# Patient Record
Sex: FEMALE | Race: WHITE | Employment: UNEMPLOYED | URBAN - METROPOLITAN AREA
[De-identification: names, ages, dates, MRNs, and addresses within clinical notes are randomized per-mention and may not be internally consistent; named-entity substitution may affect disease eponyms.]

---

## 2019-04-16 PROBLEM — E28.39 FEMALE HYPOGONADISM: Status: ACTIVE | Noted: 2019-04-16

## 2019-04-16 PROBLEM — I10 MODERATE HYPERTENSION: Status: ACTIVE | Noted: 2019-04-16

## 2021-08-17 ENCOUNTER — LAB VISIT (OUTPATIENT)
Dept: PRIMARY CARE CLINIC | Facility: OTHER | Age: 56
End: 2021-08-17
Payer: COMMERCIAL

## 2021-08-17 DIAGNOSIS — Z20.822 ENCOUNTER FOR LABORATORY TESTING FOR COVID-19 VIRUS: ICD-10-CM

## 2021-08-17 PROCEDURE — U0003 INFECTIOUS AGENT DETECTION BY NUCLEIC ACID (DNA OR RNA); SEVERE ACUTE RESPIRATORY SYNDROME CORONAVIRUS 2 (SARS-COV-2) (CORONAVIRUS DISEASE [COVID-19]), AMPLIFIED PROBE TECHNIQUE, MAKING USE OF HIGH THROUGHPUT TECHNOLOGIES AS DESCRIBED BY CMS-2020-01-R: HCPCS | Performed by: FAMILY MEDICINE

## 2021-08-18 LAB
SARS-COV-2 RNA RESP QL NAA+PROBE: NOT DETECTED
SARS-COV-2- CYCLE NUMBER: -1

## 2021-10-15 ENCOUNTER — HOSPITAL ENCOUNTER (OUTPATIENT)
Dept: RADIOLOGY | Facility: HOSPITAL | Age: 56
Discharge: HOME | End: 2021-10-15
Attending: ORTHOPAEDIC SURGERY
Payer: COMMERCIAL

## 2021-10-15 ENCOUNTER — OFFICE VISIT (OUTPATIENT)
Dept: ORTHOPEDICS | Facility: CLINIC | Age: 56
End: 2021-10-15
Payer: COMMERCIAL

## 2021-10-15 VITALS — HEIGHT: 66 IN | WEIGHT: 153 LBS | BODY MASS INDEX: 24.59 KG/M2

## 2021-10-15 DIAGNOSIS — M79.89 PAIN AND SWELLING OF LOWER LEG, LEFT: ICD-10-CM

## 2021-10-15 DIAGNOSIS — M79.662 PAIN AND SWELLING OF LOWER LEG, LEFT: ICD-10-CM

## 2021-10-15 DIAGNOSIS — S82.102A CLOSED FRACTURE OF PROXIMAL END OF LEFT TIBIA, UNSPECIFIED FRACTURE MORPHOLOGY, INITIAL ENCOUNTER: Primary | ICD-10-CM

## 2021-10-15 DIAGNOSIS — S82.142A CLOSED FRACTURE OF LEFT TIBIAL PLATEAU, INITIAL ENCOUNTER: Primary | ICD-10-CM

## 2021-10-15 DIAGNOSIS — S82.142A CLOSED FRACTURE OF LEFT TIBIAL PLATEAU, INITIAL ENCOUNTER: ICD-10-CM

## 2021-10-15 PROCEDURE — 3008F PR BODY MASS INDEX (BMI) DOCUMENTED: ICD-10-PCS | Mod: CPTII,S$GLB,, | Performed by: ORTHOPAEDIC SURGERY

## 2021-10-15 PROCEDURE — 4010F PR ACE/ARB THEARPY RXD/TAKEN: ICD-10-PCS | Mod: CPTII,S$GLB,, | Performed by: ORTHOPAEDIC SURGERY

## 2021-10-15 PROCEDURE — 1159F PR MEDICATION LIST DOCUMENTED IN MEDICAL RECORD: ICD-10-PCS | Mod: CPTII,S$GLB,, | Performed by: ORTHOPAEDIC SURGERY

## 2021-10-15 PROCEDURE — 73590 XR TIBIA FIBULA 2 VIEW LEFT: ICD-10-PCS | Mod: 26,LT,, | Performed by: RADIOLOGY

## 2021-10-15 PROCEDURE — 3008F BODY MASS INDEX DOCD: CPT | Mod: CPTII,S$GLB,, | Performed by: ORTHOPAEDIC SURGERY

## 2021-10-15 PROCEDURE — 99204 PR OFFICE/OUTPT VISIT, NEW, LEVL IV, 45-59 MIN: ICD-10-PCS | Mod: 57,S$GLB,, | Performed by: ORTHOPAEDIC SURGERY

## 2021-10-15 PROCEDURE — 73590 X-RAY EXAM OF LOWER LEG: CPT | Mod: TC,PO,LT

## 2021-10-15 PROCEDURE — 99204 OFFICE O/P NEW MOD 45 MIN: CPT | Mod: 57,S$GLB,, | Performed by: ORTHOPAEDIC SURGERY

## 2021-10-15 PROCEDURE — 1160F PR REVIEW ALL MEDS BY PRESCRIBER/CLIN PHARMACIST DOCUMENTED: ICD-10-PCS | Mod: CPTII,S$GLB,, | Performed by: ORTHOPAEDIC SURGERY

## 2021-10-15 PROCEDURE — 73590 X-RAY EXAM OF LOWER LEG: CPT | Mod: 26,LT,, | Performed by: RADIOLOGY

## 2021-10-15 PROCEDURE — 99999 PR PBB SHADOW E&M-EST. PATIENT-LVL III: CPT | Mod: PBBFAC,,, | Performed by: ORTHOPAEDIC SURGERY

## 2021-10-15 PROCEDURE — 1160F RVW MEDS BY RX/DR IN RCRD: CPT | Mod: CPTII,S$GLB,, | Performed by: ORTHOPAEDIC SURGERY

## 2021-10-15 PROCEDURE — 97760 ORTHOTIC MGMT&TRAING 1ST ENC: CPT | Mod: GP,S$GLB,, | Performed by: ORTHOPAEDIC SURGERY

## 2021-10-15 PROCEDURE — 27530 PR CLOSED RX TIBIAL PLATEAU FX: ICD-10-PCS | Mod: LT,S$GLB,, | Performed by: ORTHOPAEDIC SURGERY

## 2021-10-15 PROCEDURE — 4010F ACE/ARB THERAPY RXD/TAKEN: CPT | Mod: CPTII,S$GLB,, | Performed by: ORTHOPAEDIC SURGERY

## 2021-10-15 PROCEDURE — 97760 PR ORTHOTIC MGMT&TRAINJ INITIAL ENC EA 15 MINS: ICD-10-PCS | Mod: GP,S$GLB,, | Performed by: ORTHOPAEDIC SURGERY

## 2021-10-15 PROCEDURE — 99999 PR PBB SHADOW E&M-EST. PATIENT-LVL III: ICD-10-PCS | Mod: PBBFAC,,, | Performed by: ORTHOPAEDIC SURGERY

## 2021-10-15 PROCEDURE — 27530 TREAT KNEE FRACTURE: CPT | Mod: LT,S$GLB,, | Performed by: ORTHOPAEDIC SURGERY

## 2021-10-15 PROCEDURE — 1159F MED LIST DOCD IN RCRD: CPT | Mod: CPTII,S$GLB,, | Performed by: ORTHOPAEDIC SURGERY

## 2021-10-26 DIAGNOSIS — S82.142A CLOSED FRACTURE OF LEFT TIBIAL PLATEAU, INITIAL ENCOUNTER: Primary | ICD-10-CM

## 2021-10-29 ENCOUNTER — OFFICE VISIT (OUTPATIENT)
Dept: ORTHOPEDICS | Facility: CLINIC | Age: 56
End: 2021-10-29
Payer: COMMERCIAL

## 2021-10-29 ENCOUNTER — HOSPITAL ENCOUNTER (OUTPATIENT)
Dept: RADIOLOGY | Facility: HOSPITAL | Age: 56
Discharge: HOME | End: 2021-10-29
Attending: ORTHOPAEDIC SURGERY
Payer: COMMERCIAL

## 2021-10-29 VITALS — HEIGHT: 66 IN | BODY MASS INDEX: 24.59 KG/M2 | WEIGHT: 153 LBS

## 2021-10-29 DIAGNOSIS — S82.142A CLOSED FRACTURE OF LEFT TIBIAL PLATEAU, INITIAL ENCOUNTER: Primary | ICD-10-CM

## 2021-10-29 DIAGNOSIS — S82.142A CLOSED FRACTURE OF LEFT TIBIAL PLATEAU, INITIAL ENCOUNTER: ICD-10-CM

## 2021-10-29 PROCEDURE — 1159F PR MEDICATION LIST DOCUMENTED IN MEDICAL RECORD: ICD-10-PCS | Mod: CPTII,S$GLB,, | Performed by: ORTHOPAEDIC SURGERY

## 2021-10-29 PROCEDURE — 73560 X-RAY EXAM OF KNEE 1 OR 2: CPT | Mod: 59,TC,PO,RT

## 2021-10-29 PROCEDURE — 73560 XR KNEE ORTHO LEFT: ICD-10-PCS | Mod: 26,RT,, | Performed by: RADIOLOGY

## 2021-10-29 PROCEDURE — 73590 X-RAY EXAM OF LOWER LEG: CPT | Mod: 26,LT,, | Performed by: RADIOLOGY

## 2021-10-29 PROCEDURE — 73560 X-RAY EXAM OF KNEE 1 OR 2: CPT | Mod: 26,RT,, | Performed by: RADIOLOGY

## 2021-10-29 PROCEDURE — 99999 PR PBB SHADOW E&M-EST. PATIENT-LVL III: ICD-10-PCS | Mod: PBBFAC,,, | Performed by: ORTHOPAEDIC SURGERY

## 2021-10-29 PROCEDURE — 1160F RVW MEDS BY RX/DR IN RCRD: CPT | Mod: CPTII,S$GLB,, | Performed by: ORTHOPAEDIC SURGERY

## 2021-10-29 PROCEDURE — 73590 X-RAY EXAM OF LOWER LEG: CPT | Mod: TC,PO,LT

## 2021-10-29 PROCEDURE — 73590 XR TIBIA FIBULA 2 VIEW LEFT: ICD-10-PCS | Mod: 26,LT,, | Performed by: RADIOLOGY

## 2021-10-29 PROCEDURE — 1159F MED LIST DOCD IN RCRD: CPT | Mod: CPTII,S$GLB,, | Performed by: ORTHOPAEDIC SURGERY

## 2021-10-29 PROCEDURE — 73562 XR KNEE ORTHO LEFT: ICD-10-PCS | Mod: 26,LT,, | Performed by: RADIOLOGY

## 2021-10-29 PROCEDURE — 73562 X-RAY EXAM OF KNEE 3: CPT | Mod: 26,LT,, | Performed by: RADIOLOGY

## 2021-10-29 PROCEDURE — 1160F PR REVIEW ALL MEDS BY PRESCRIBER/CLIN PHARMACIST DOCUMENTED: ICD-10-PCS | Mod: CPTII,S$GLB,, | Performed by: ORTHOPAEDIC SURGERY

## 2021-10-29 PROCEDURE — 99999 PR PBB SHADOW E&M-EST. PATIENT-LVL III: CPT | Mod: PBBFAC,,, | Performed by: ORTHOPAEDIC SURGERY

## 2021-10-29 PROCEDURE — 3008F PR BODY MASS INDEX (BMI) DOCUMENTED: ICD-10-PCS | Mod: CPTII,S$GLB,, | Performed by: ORTHOPAEDIC SURGERY

## 2021-10-29 PROCEDURE — 99024 PR POST-OP FOLLOW-UP VISIT: ICD-10-PCS | Mod: S$GLB,,, | Performed by: ORTHOPAEDIC SURGERY

## 2021-10-29 PROCEDURE — 4010F ACE/ARB THERAPY RXD/TAKEN: CPT | Mod: CPTII,S$GLB,, | Performed by: ORTHOPAEDIC SURGERY

## 2021-10-29 PROCEDURE — 3008F BODY MASS INDEX DOCD: CPT | Mod: CPTII,S$GLB,, | Performed by: ORTHOPAEDIC SURGERY

## 2021-10-29 PROCEDURE — 99024 POSTOP FOLLOW-UP VISIT: CPT | Mod: S$GLB,,, | Performed by: ORTHOPAEDIC SURGERY

## 2021-10-29 PROCEDURE — 4010F PR ACE/ARB THEARPY RXD/TAKEN: ICD-10-PCS | Mod: CPTII,S$GLB,, | Performed by: ORTHOPAEDIC SURGERY

## 2021-11-24 DIAGNOSIS — S82.142A CLOSED FRACTURE OF LEFT TIBIAL PLATEAU, INITIAL ENCOUNTER: Primary | ICD-10-CM

## 2021-11-29 ENCOUNTER — HOSPITAL ENCOUNTER (OUTPATIENT)
Dept: RADIOLOGY | Facility: HOSPITAL | Age: 56
Discharge: HOME | End: 2021-11-29
Attending: ORTHOPAEDIC SURGERY
Payer: COMMERCIAL

## 2021-11-29 ENCOUNTER — CLINICAL SUPPORT (OUTPATIENT)
Dept: REHABILITATION | Facility: HOSPITAL | Age: 56
End: 2021-11-29
Attending: ORTHOPAEDIC SURGERY
Payer: COMMERCIAL

## 2021-11-29 ENCOUNTER — OFFICE VISIT (OUTPATIENT)
Dept: ORTHOPEDICS | Facility: CLINIC | Age: 56
End: 2021-11-29
Payer: COMMERCIAL

## 2021-11-29 VITALS — HEIGHT: 66 IN | BODY MASS INDEX: 24.59 KG/M2 | WEIGHT: 153 LBS

## 2021-11-29 DIAGNOSIS — M25.60 DECREASED RANGE OF MOTION: ICD-10-CM

## 2021-11-29 DIAGNOSIS — M25.562 CHRONIC PAIN OF LEFT KNEE: Primary | ICD-10-CM

## 2021-11-29 DIAGNOSIS — S82.142A CLOSED FRACTURE OF LEFT TIBIAL PLATEAU, INITIAL ENCOUNTER: ICD-10-CM

## 2021-11-29 DIAGNOSIS — M79.662 PAIN AND SWELLING OF LOWER LEG, LEFT: ICD-10-CM

## 2021-11-29 DIAGNOSIS — M62.81 MUSCLE WEAKNESS: ICD-10-CM

## 2021-11-29 DIAGNOSIS — G89.29 CHRONIC PAIN OF LEFT KNEE: Primary | ICD-10-CM

## 2021-11-29 DIAGNOSIS — R26.2 DIFFICULTY WALKING: ICD-10-CM

## 2021-11-29 DIAGNOSIS — S82.142A CLOSED FRACTURE OF LEFT TIBIAL PLATEAU, INITIAL ENCOUNTER: Primary | ICD-10-CM

## 2021-11-29 DIAGNOSIS — M79.89 PAIN AND SWELLING OF LOWER LEG, LEFT: ICD-10-CM

## 2021-11-29 PROCEDURE — 73562 X-RAY EXAM OF KNEE 3: CPT | Mod: 26,LT,, | Performed by: RADIOLOGY

## 2021-11-29 PROCEDURE — 73560 X-RAY EXAM OF KNEE 1 OR 2: CPT | Mod: TC,RT,59,PO

## 2021-11-29 PROCEDURE — 99999 PR PBB SHADOW E&M-EST. PATIENT-LVL III: CPT | Mod: PBBFAC,,, | Performed by: ORTHOPAEDIC SURGERY

## 2021-11-29 PROCEDURE — 99024 PR POST-OP FOLLOW-UP VISIT: ICD-10-PCS | Mod: S$GLB,,, | Performed by: ORTHOPAEDIC SURGERY

## 2021-11-29 PROCEDURE — 99999 PR PBB SHADOW E&M-EST. PATIENT-LVL III: ICD-10-PCS | Mod: PBBFAC,,, | Performed by: ORTHOPAEDIC SURGERY

## 2021-11-29 PROCEDURE — 73562 XR KNEE ORTHO LEFT: ICD-10-PCS | Mod: 26,LT,, | Performed by: RADIOLOGY

## 2021-11-29 PROCEDURE — 73560 X-RAY EXAM OF KNEE 1 OR 2: CPT | Mod: 26,RT,, | Performed by: RADIOLOGY

## 2021-11-29 PROCEDURE — 97161 PT EVAL LOW COMPLEX 20 MIN: CPT | Mod: PN | Performed by: PHYSICAL THERAPIST

## 2021-11-29 PROCEDURE — 73560 XR KNEE ORTHO LEFT: ICD-10-PCS | Mod: 26,RT,, | Performed by: RADIOLOGY

## 2021-11-29 PROCEDURE — 4010F ACE/ARB THERAPY RXD/TAKEN: CPT | Mod: CPTII,S$GLB,, | Performed by: ORTHOPAEDIC SURGERY

## 2021-11-29 PROCEDURE — 97110 THERAPEUTIC EXERCISES: CPT | Mod: PN | Performed by: PHYSICAL THERAPIST

## 2021-11-29 PROCEDURE — 99024 POSTOP FOLLOW-UP VISIT: CPT | Mod: S$GLB,,, | Performed by: ORTHOPAEDIC SURGERY

## 2021-11-29 PROCEDURE — 4010F PR ACE/ARB THEARPY RXD/TAKEN: ICD-10-PCS | Mod: CPTII,S$GLB,, | Performed by: ORTHOPAEDIC SURGERY

## 2021-11-29 PROCEDURE — 97116 GAIT TRAINING THERAPY: CPT | Mod: PN | Performed by: PHYSICAL THERAPIST

## 2021-12-01 ENCOUNTER — CLINICAL SUPPORT (OUTPATIENT)
Dept: REHABILITATION | Facility: HOSPITAL | Age: 56
End: 2021-12-01
Payer: COMMERCIAL

## 2021-12-01 DIAGNOSIS — M25.562 CHRONIC PAIN OF LEFT KNEE: Primary | ICD-10-CM

## 2021-12-01 DIAGNOSIS — G89.29 CHRONIC PAIN OF LEFT KNEE: Primary | ICD-10-CM

## 2021-12-01 DIAGNOSIS — M62.81 MUSCLE WEAKNESS: ICD-10-CM

## 2021-12-01 DIAGNOSIS — R26.2 DIFFICULTY WALKING: ICD-10-CM

## 2021-12-01 DIAGNOSIS — M25.60 DECREASED RANGE OF MOTION: ICD-10-CM

## 2021-12-01 PROCEDURE — 97140 MANUAL THERAPY 1/> REGIONS: CPT | Mod: PN,CQ

## 2021-12-01 PROCEDURE — 97110 THERAPEUTIC EXERCISES: CPT | Mod: PN,CQ

## 2021-12-03 ENCOUNTER — CLINICAL SUPPORT (OUTPATIENT)
Dept: REHABILITATION | Facility: HOSPITAL | Age: 56
End: 2021-12-03
Payer: COMMERCIAL

## 2021-12-03 DIAGNOSIS — M25.562 CHRONIC PAIN OF LEFT KNEE: Primary | ICD-10-CM

## 2021-12-03 DIAGNOSIS — G89.29 CHRONIC PAIN OF LEFT KNEE: Primary | ICD-10-CM

## 2021-12-03 DIAGNOSIS — M62.81 MUSCLE WEAKNESS: ICD-10-CM

## 2021-12-03 DIAGNOSIS — M25.60 DECREASED RANGE OF MOTION: ICD-10-CM

## 2021-12-03 DIAGNOSIS — R26.2 DIFFICULTY WALKING: ICD-10-CM

## 2021-12-03 PROCEDURE — 97116 GAIT TRAINING THERAPY: CPT | Mod: PN,CQ

## 2021-12-03 PROCEDURE — 97110 THERAPEUTIC EXERCISES: CPT | Mod: PN,CQ

## 2021-12-03 PROCEDURE — 97140 MANUAL THERAPY 1/> REGIONS: CPT | Mod: PN,CQ

## 2021-12-06 ENCOUNTER — CLINICAL SUPPORT (OUTPATIENT)
Dept: REHABILITATION | Facility: HOSPITAL | Age: 56
End: 2021-12-06
Payer: COMMERCIAL

## 2021-12-06 DIAGNOSIS — G89.29 CHRONIC PAIN OF LEFT KNEE: Primary | ICD-10-CM

## 2021-12-06 DIAGNOSIS — M25.60 DECREASED RANGE OF MOTION: ICD-10-CM

## 2021-12-06 DIAGNOSIS — R26.2 DIFFICULTY WALKING: ICD-10-CM

## 2021-12-06 DIAGNOSIS — M25.562 CHRONIC PAIN OF LEFT KNEE: Primary | ICD-10-CM

## 2021-12-06 DIAGNOSIS — M62.81 MUSCLE WEAKNESS: ICD-10-CM

## 2021-12-06 PROCEDURE — 97140 MANUAL THERAPY 1/> REGIONS: CPT | Mod: PN | Performed by: PHYSICAL THERAPIST

## 2021-12-06 PROCEDURE — 97110 THERAPEUTIC EXERCISES: CPT | Mod: PN | Performed by: PHYSICAL THERAPIST

## 2021-12-08 ENCOUNTER — CLINICAL SUPPORT (OUTPATIENT)
Dept: REHABILITATION | Facility: HOSPITAL | Age: 56
End: 2021-12-08
Payer: COMMERCIAL

## 2021-12-08 DIAGNOSIS — R26.2 DIFFICULTY WALKING: ICD-10-CM

## 2021-12-08 DIAGNOSIS — M25.562 CHRONIC PAIN OF LEFT KNEE: Primary | ICD-10-CM

## 2021-12-08 DIAGNOSIS — M62.81 MUSCLE WEAKNESS: ICD-10-CM

## 2021-12-08 DIAGNOSIS — M25.60 DECREASED RANGE OF MOTION: ICD-10-CM

## 2021-12-08 DIAGNOSIS — G89.29 CHRONIC PAIN OF LEFT KNEE: Primary | ICD-10-CM

## 2021-12-08 PROCEDURE — 97110 THERAPEUTIC EXERCISES: CPT | Mod: PN,CQ

## 2021-12-08 PROCEDURE — 97140 MANUAL THERAPY 1/> REGIONS: CPT | Mod: PN,CQ

## 2021-12-10 ENCOUNTER — CLINICAL SUPPORT (OUTPATIENT)
Dept: REHABILITATION | Facility: HOSPITAL | Age: 56
End: 2021-12-10
Payer: COMMERCIAL

## 2021-12-10 DIAGNOSIS — R26.2 DIFFICULTY WALKING: ICD-10-CM

## 2021-12-10 DIAGNOSIS — M25.60 DECREASED RANGE OF MOTION: ICD-10-CM

## 2021-12-10 DIAGNOSIS — G89.29 CHRONIC PAIN OF LEFT KNEE: Primary | ICD-10-CM

## 2021-12-10 DIAGNOSIS — M62.81 MUSCLE WEAKNESS: ICD-10-CM

## 2021-12-10 DIAGNOSIS — M25.562 CHRONIC PAIN OF LEFT KNEE: Primary | ICD-10-CM

## 2021-12-10 PROCEDURE — 97110 THERAPEUTIC EXERCISES: CPT | Mod: PN,CQ

## 2022-01-04 ENCOUNTER — CLINICAL SUPPORT (OUTPATIENT)
Dept: REHABILITATION | Facility: HOSPITAL | Age: 57
End: 2022-01-04
Payer: COMMERCIAL

## 2022-01-04 DIAGNOSIS — M62.81 MUSCLE WEAKNESS: ICD-10-CM

## 2022-01-04 DIAGNOSIS — M25.562 CHRONIC PAIN OF LEFT KNEE: Primary | ICD-10-CM

## 2022-01-04 DIAGNOSIS — M25.60 DECREASED RANGE OF MOTION: ICD-10-CM

## 2022-01-04 DIAGNOSIS — G89.29 CHRONIC PAIN OF LEFT KNEE: Primary | ICD-10-CM

## 2022-01-04 DIAGNOSIS — R26.2 DIFFICULTY WALKING: ICD-10-CM

## 2022-01-04 PROCEDURE — 97110 THERAPEUTIC EXERCISES: CPT | Mod: PN,CQ

## 2022-01-06 NOTE — PROGRESS NOTES
[facility]   Physical Therapy Progress and  Treatment Note     Name: Magda Farris  Clinic Number: [id number]    Therapy Diagnosis:   Encounter Diagnoses   Name Primary?    Chronic pain of left knee Yes    Decreased range of motion     Muscle weakness     Difficulty walking      Physician: Akshat John MD    Visit Date: 1/7/2022    Physician Orders: PT Eval and Treat  Progressive weight-bearing to tolerance, will get her set up with physical therapy to assist with range of motion strengthening gait training, follow-up in 6 weeks time is a postoperative visit with x-rays of her left knee  Medical Diagnosis from Referral:   S82.142A (ICD-10-CM) - Closed fracture of left tibial plateau, initial encounter   M79.662,M79.89 (ICD-10-CM) - Pain and swelling of lower leg, left         Evaluation Date: 11/29/2021  Authorization Period Expiration: 1-31-22  Plan of Care Expiration: 2-18-22  Visit # / Visits authorized: 7/ 24 (+1 from eval)  MD Follow up appointment: 1-10-22    PTA Visit #: 3/5     Time In: 9:03  Time Out: 10:02  Total Billable Time: 59 minutes     Precautions: Standard, Weightbearing and pt is post tibial plateau fracture    SUBJECTIVE.      Pt reports: no pain currently. Pt states lateral posterior pain yesterday.  Pt states she did not do her exercises yesterday.  Pt states she di walk for exercise for 50 minutes.  Pt states no pain, felt full like some swelling.    She was compliant with home exercise program.  Response to previous treatment: felt a lot better  Functional change: able to walk some without crutches    Pain: 0/10 pain, but discomfort from the swelling at worst 2/10 at best 0/10  Initial eval Current 0/10, worst 3/10, best 0/10   Location: left knee      OBJECTIVE     Functional assessment:   - walking/gait:normal gait at IE WBTT L with crutches and ambulates with steppage gait at this time due to lack of knee ext   - sit to stand: no difficulty at  "IE min difficulty  - sit to supine: no difficulty at IEmin difficulty       - supine to sit: no difficulty at IE min difficulty  - supine to prone:no difficulty at IE mod difficulty     Knee Girth: (measured in centimeters)1-7-22  Knee LLE   Mid joint 39.0   Suprapatella 39.6   Infrapatella 37.3   Mid thigh 6" above joint line 48.2   Mid calf 37.3          Knee Girth: (measured in centimeters) initial eval  Knee RLE LLE   Mid joint 38.0 38.5   Suprapatella 38.2 39.7   Infrapatella 35.0 35.3   Mid thigh 6" above joint line 51.5 47.5   Mid calf 38.9 37.0      Knee  ROM: (measured in degrees) 1-7-22  Knee (L)   Flexion 120   Extension 5        Knee  ROM: (measured in degrees)   Knee (R) (L)   Flexion 140 80   Extension 5 -20         Flexibility testing:  - hamstrings:     SLR test R 10 L 10 at IE R 10 L 15           - gastrocnemius:   DF ankle R 10 degrees L 10 degrees    Defer MMT until MD clear fracture stability 1-7-22     Muscle Strength deferred LLE due to fracture status hip flexion at least 3/5  MMT R   Hip flexion 5/5         Knee extension 5/5   Knee flexion 5/5      Endurance is good at IE   fair     Special tests: 0 ext lag at IE  LAQ ext lag 25, continue with some atrophy of quad noted     Palpation: min TTP stating feels sensitive at IE  noted mod TTP lateral tibial plateau region      Joint mobility: good patella mobility noted at IE decreased patella mobility all planes, mod medial/lateral, mod-severe inf/sup      CMS Impairment/Limitation/Restriction for FOTO knee Survey     Therapist reviewed FOTO scores for Magda Farris   FOTO documents entered into [employer] - see Media section.     Limitation Score: 35% at IE 45%         Treatment     Evelyn received the treatments listed below:      therapeutic exercises to develop strength, endurance, ROM, flexibility and core stabilization for 59 minutes including:  Heel slides x 20   SLR x 20, no extensor lag 2# weight applied to distal thigh to avoid stress " on tibia  Hip abd sidelying x 20   2# weight applied to distal thigh to avoid stress on tibia  Hip add sidelying x 20 -  2# weight applied to distal thigh to avoid stress on tibia  Hip extension prone x 20  2# weight applied to distal thigh to avoid stress on tibia   Hip ext prone with bent knee x 20  2# weight applied to distal thigh to avoid stress on tibia    HS stretch sitting x 10  GSS with strap x 10    Pt noted to descend with valgus       Heel raises x 2/10   Minisquat x 10 -cue for weight in midfoot    TKE at wall with ball behind knee 2 x 10 with 5 count    Shuttle 2 leg 2.5 bands x 2/10      Possible for next time: retro walk on TM for decreased hamstring activation.       Deferred to home: cold pack for 00 minutes to L knee        Patient Education and Home Exercises     Home Exercises Provided and Patient Education Provided     Education provided:   - Educated patient on expectation for fluctuations in soreness, swelling, and aching due to progression for strengthening as well as the healing process.  - pacing herself  - HEP    Written Home Exercises Provided: continue with prior HEP.   Exercises were reviewed and Evelyn was able to demonstrate them prior to the end of the session.  Evelyn demonstrated good  understanding of the education provided. See EMR under Patient Instructions in Notes section for exercises provided during therapy sessions    ASSESSMENT   Pt was out of town for 3 weeks and was not able to be consistent with her HEP.  Pt does exhibit improvement in ROM.  Strength is improved with no extensor lag with knee ext and improved gait.  Pt with increased swelling of knee possibly due to walking for prolonged period of time for first time even though slow pace.  Pt appears to understand need to gradually progress with activities and with restarting PT can help provide pt more guidance.  Pt was very active prior to injury and will benefit from continued PT to assist her in gradual return to  previous level of function      Evelyn is progressing well towards her goals.   Pt prognosis is Good.     Pt will continue to benefit from skilled outpatient physical therapy to address the deficits listed in the problem list box on initial evaluation, provide pt/family education and to maximize pt's level of independence in the home and community environment.     Pt's spiritual, cultural and educational needs considered and pt agreeable to plan of care and goals.     Anticipated barriers to physical therapy: patient was out of town 12-13-21 until 1-3-22    Goals:   Short Term Goals:  3 weeks   Increase range of motion 25% MET  Increase strength 1/2 muscle grade (Progressing, not met)  Be able to perform HEP with minimal cueing required  MET     Long Term Goals: 6 weeks  Increase range of motion to 75% to 100% full  MET  Improve muscle strength 1 muscle grade(Progressing, not met)  Improve muscle strength with MMT to 4+/5 to 5/5(Progressing, not met)  Restore ability to ambulate with normal pain free gait  MET  Walking for ADL and exercise will be restored without increased pain(Progressing, not met)  Restore ability to stand for ADL without increased pain(Progressing, not met)  Restore ability to climb stairs in a reciprocal manner with min to 0 increased pain and/or difficulty(Progressing, not met)  Restore ability to perform ADL's and household activities independently and without increased pain(Progressing, not met)      PLAN     If you concur, I recommend patient continue with physical therapy 2 times a week  for 6 weeks.  Please advise us of your  Recommendations and any additional precautions regarding pt restoration to full function. Thank you for allowing us to assist in the care of your patient.      Rosaline Alaniz, MS, PT          I certify the need for these services furnished under this plan of treatment and while under my care.    ____________________________________ Physician/Referring  Practitioner                                Date of Signature

## 2022-01-07 ENCOUNTER — DOCUMENTATION ONLY (OUTPATIENT)
Dept: REHABILITATION | Facility: HOSPITAL | Age: 57
End: 2022-01-07

## 2022-01-07 ENCOUNTER — CLINICAL SUPPORT (OUTPATIENT)
Dept: REHABILITATION | Facility: HOSPITAL | Age: 57
End: 2022-01-07
Payer: COMMERCIAL

## 2022-01-07 DIAGNOSIS — M25.60 DECREASED RANGE OF MOTION: ICD-10-CM

## 2022-01-07 DIAGNOSIS — M62.81 MUSCLE WEAKNESS: ICD-10-CM

## 2022-01-07 DIAGNOSIS — M25.562 CHRONIC PAIN OF LEFT KNEE: Primary | ICD-10-CM

## 2022-01-07 DIAGNOSIS — G89.29 CHRONIC PAIN OF LEFT KNEE: Primary | ICD-10-CM

## 2022-01-07 DIAGNOSIS — R26.2 DIFFICULTY WALKING: ICD-10-CM

## 2022-01-07 DIAGNOSIS — M25.562 LEFT KNEE PAIN, UNSPECIFIED CHRONICITY: Primary | ICD-10-CM

## 2022-01-07 PROCEDURE — 97110 THERAPEUTIC EXERCISES: CPT | Mod: PN | Performed by: PHYSICAL THERAPIST

## 2022-01-07 NOTE — PROGRESS NOTES
PT/PTA met face to face to discuss pt's treatment plan and progress towards established goals. Pt will be seen by physical therapy every 6th visit and minimally once per month.

## 2022-01-07 NOTE — PLAN OF CARE
[facility]   Physical Therapy Progress and  Treatment Note     Name: Magda Farris  Clinic Number: [id number]    Therapy Diagnosis:   Encounter Diagnoses   Name Primary?    Chronic pain of left knee Yes    Decreased range of motion     Muscle weakness     Difficulty walking      Physician: Akshat John MD    Visit Date: 1/7/2022    Physician Orders: PT Eval and Treat  Progressive weight-bearing to tolerance, will get her set up with physical therapy to assist with range of motion strengthening gait training, follow-up in 6 weeks time is a postoperative visit with x-rays of her left knee  Medical Diagnosis from Referral:   S82.142A (ICD-10-CM) - Closed fracture of left tibial plateau, initial encounter   M79.662,M79.89 (ICD-10-CM) - Pain and swelling of lower leg, left         Evaluation Date: 11/29/2021  Authorization Period Expiration: 1-31-22  Plan of Care Expiration: 2-18-22  Visit # / Visits authorized: 7/ 24 (+1 from eval)  MD Follow up appointment: 1-10-22    PTA Visit #: 3/5     Time In: 9:03  Time Out: 10:02  Total Billable Time: 59 minutes     Precautions: Standard, Weightbearing and pt is post tibial plateau fracture    SUBJECTIVE.      Pt reports: no pain currently. Pt states lateral posterior pain yesterday.  Pt states she did not do her exercises yesterday.  Pt states she di walk for exercise for 50 minutes.  Pt states no pain, felt full like some swelling.    She was compliant with home exercise program.  Response to previous treatment: felt a lot better  Functional change: able to walk some without crutches    Pain: 0/10 pain, but discomfort from the swelling at worst 2/10 at best 0/10  Initial eval Current 0/10, worst 3/10, best 0/10   Location: left knee      OBJECTIVE     Functional assessment:   - walking/gait:normal gait at IE WBTT L with crutches and ambulates with steppage gait at this time due to lack of knee ext   - sit to stand: no difficulty at  "IE min difficulty  - sit to supine: no difficulty at IEmin difficulty       - supine to sit: no difficulty at IE min difficulty  - supine to prone:no difficulty at IE mod difficulty     Knee Girth: (measured in centimeters)1-7-22  Knee LLE   Mid joint 39.0   Suprapatella 39.6   Infrapatella 37.3   Mid thigh 6" above joint line 48.2   Mid calf 37.3          Knee Girth: (measured in centimeters) initial eval  Knee RLE LLE   Mid joint 38.0 38.5   Suprapatella 38.2 39.7   Infrapatella 35.0 35.3   Mid thigh 6" above joint line 51.5 47.5   Mid calf 38.9 37.0      Knee  ROM: (measured in degrees) 1-7-22  Knee (L)   Flexion 120   Extension 5        Knee  ROM: (measured in degrees)   Knee (R) (L)   Flexion 140 80   Extension 5 -20         Flexibility testing:  - hamstrings:     SLR test R 10 L 10 at IE R 10 L 15           - gastrocnemius:   DF ankle R 10 degrees L 10 degrees    Defer MMT until MD clear fracture stability 1-7-22     Muscle Strength deferred LLE due to fracture status hip flexion at least 3/5  MMT R   Hip flexion 5/5         Knee extension 5/5   Knee flexion 5/5      Endurance is good at IE   fair     Special tests: 0 ext lag at IE  LAQ ext lag 25, continue with some atrophy of quad noted     Palpation: min TTP stating feels sensitive at IE  noted mod TTP lateral tibial plateau region      Joint mobility: good patella mobility noted at IE decreased patella mobility all planes, mod medial/lateral, mod-severe inf/sup      CMS Impairment/Limitation/Restriction for FOTO knee Survey     Therapist reviewed FOTO scores for Magda Farris   FOTO documents entered into [employer] - see Media section.     Limitation Score: 35% at IE 45%         Treatment     Evelyn received the treatments listed below:      therapeutic exercises to develop strength, endurance, ROM, flexibility and core stabilization for 59 minutes including:  Heel slides x 20   SLR x 20, no extensor lag 2# weight applied to distal thigh to avoid stress " on tibia  Hip abd sidelying x 20   2# weight applied to distal thigh to avoid stress on tibia  Hip add sidelying x 20 -  2# weight applied to distal thigh to avoid stress on tibia  Hip extension prone x 20  2# weight applied to distal thigh to avoid stress on tibia   Hip ext prone with bent knee x 20  2# weight applied to distal thigh to avoid stress on tibia    HS stretch sitting x 10  GSS with strap x 10    Pt noted to descend with valgus       Heel raises x 2/10   Minisquat x 10 -cue for weight in midfoot    TKE at wall with ball behind knee 2 x 10 with 5 count    Shuttle 2 leg 2.5 bands x 2/10      Possible for next time: retro walk on TM for decreased hamstring activation.     Deferred to home: cold pack for 00 minutes to L knee        Patient Education and Home Exercises     Home Exercises Provided and Patient Education Provided     Education provided:   - Educated patient on expectation for fluctuations in soreness, swelling, and aching due to progression for strengthening as well as the healing process.  - pacing herself  - HEP    Written Home Exercises Provided: continue with prior HEP.   Exercises were reviewed and Evelyn was able to demonstrate them prior to the end of the session.  Evelyn demonstrated good  understanding of the education provided. See EMR under Patient Instructions in Notes section for exercises provided during therapy sessions    ASSESSMENT   Pt was out of town for 3 weeks and was not able to be consistent with her HEP.  Pt does exhibit improvement in ROM.  Strength is improved with no extensor lag with knee ext and improved gait.  Pt with increased swelling of knee possibly due to walking for prolonged period of time for first time even though slow pace.  Pt appears to understand need to gradually progress with activities and with restarting PT can help provide pt more guidance.  Pt was very active prior to injury and will benefit from continued PT to assist her in gradual return to  previous level of function      Evelyn is progressing well towards her goals.   Pt prognosis is Good.     Pt will continue to benefit from skilled outpatient physical therapy to address the deficits listed in the problem list box on initial evaluation, provide pt/family education and to maximize pt's level of independence in the home and community environment.     Pt's spiritual, cultural and educational needs considered and pt agreeable to plan of care and goals.     Anticipated barriers to physical therapy: patient was out of town 12-13-21 until 1-3-22    Goals:   Short Term Goals:  3 weeks   Increase range of motion 25% MET  Increase strength 1/2 muscle grade (Progressing, not met)  Be able to perform HEP with minimal cueing required  MET     Long Term Goals: 6 weeks  Increase range of motion to 75% to 100% full  MET  Improve muscle strength 1 muscle grade(Progressing, not met)  Improve muscle strength with MMT to 4+/5 to 5/5(Progressing, not met)  Restore ability to ambulate with normal pain free gait  MET  Walking for ADL and exercise will be restored without increased pain(Progressing, not met)  Restore ability to stand for ADL without increased pain(Progressing, not met)  Restore ability to climb stairs in a reciprocal manner with min to 0 increased pain and/or difficulty(Progressing, not met)  Restore ability to perform ADL's and household activities independently and without increased pain(Progressing, not met)      PLAN     If you concur, I recommend patient continue with physical therapy 2 times a week  for 6 weeks.  Please advise us of your  Recommendations and any additional precautions regarding pt restoration to full function. Thank you for allowing us to assist in the care of your patient.      Rosaline Alaniz, MS, PT          I certify the need for these services furnished under this plan of treatment and while under my care.    ____________________________________ Physician/Referring  Practitioner                                Date of Signature

## 2022-01-10 ENCOUNTER — CLINICAL SUPPORT (OUTPATIENT)
Dept: REHABILITATION | Facility: HOSPITAL | Age: 57
End: 2022-01-10
Payer: COMMERCIAL

## 2022-01-10 ENCOUNTER — HOSPITAL ENCOUNTER (OUTPATIENT)
Dept: RADIOLOGY | Facility: HOSPITAL | Age: 57
Discharge: HOME | End: 2022-01-10
Attending: ORTHOPAEDIC SURGERY
Payer: COMMERCIAL

## 2022-01-10 ENCOUNTER — OFFICE VISIT (OUTPATIENT)
Dept: ORTHOPEDICS | Facility: CLINIC | Age: 57
End: 2022-01-10
Payer: COMMERCIAL

## 2022-01-10 VITALS — WEIGHT: 153 LBS | HEIGHT: 66 IN | BODY MASS INDEX: 24.59 KG/M2

## 2022-01-10 DIAGNOSIS — M25.562 LEFT KNEE PAIN, UNSPECIFIED CHRONICITY: ICD-10-CM

## 2022-01-10 DIAGNOSIS — M62.81 MUSCLE WEAKNESS: ICD-10-CM

## 2022-01-10 DIAGNOSIS — G89.29 CHRONIC PAIN OF LEFT KNEE: Primary | ICD-10-CM

## 2022-01-10 DIAGNOSIS — M25.562 CHRONIC PAIN OF LEFT KNEE: Primary | ICD-10-CM

## 2022-01-10 DIAGNOSIS — S82.142A CLOSED FRACTURE OF LEFT TIBIAL PLATEAU, INITIAL ENCOUNTER: Primary | ICD-10-CM

## 2022-01-10 DIAGNOSIS — M25.60 DECREASED RANGE OF MOTION: ICD-10-CM

## 2022-01-10 DIAGNOSIS — R26.2 DIFFICULTY WALKING: ICD-10-CM

## 2022-01-10 PROCEDURE — 1159F MED LIST DOCD IN RCRD: CPT | Mod: CPTII,S$GLB,, | Performed by: ORTHOPAEDIC SURGERY

## 2022-01-10 PROCEDURE — 3008F BODY MASS INDEX DOCD: CPT | Mod: CPTII,S$GLB,, | Performed by: ORTHOPAEDIC SURGERY

## 2022-01-10 PROCEDURE — 97110 THERAPEUTIC EXERCISES: CPT | Mod: PN | Performed by: PHYSICAL THERAPIST

## 2022-01-10 PROCEDURE — 99024 POSTOP FOLLOW-UP VISIT: CPT | Mod: S$GLB,,, | Performed by: ORTHOPAEDIC SURGERY

## 2022-01-10 PROCEDURE — 99999 PR PBB SHADOW E&M-EST. PATIENT-LVL III: CPT | Mod: PBBFAC,,, | Performed by: ORTHOPAEDIC SURGERY

## 2022-01-10 PROCEDURE — 3008F PR BODY MASS INDEX (BMI) DOCUMENTED: ICD-10-PCS | Mod: CPTII,S$GLB,, | Performed by: ORTHOPAEDIC SURGERY

## 2022-01-10 PROCEDURE — 73562 XR KNEE ORTHO LEFT: ICD-10-PCS | Mod: 26,LT,, | Performed by: RADIOLOGY

## 2022-01-10 PROCEDURE — 73560 X-RAY EXAM OF KNEE 1 OR 2: CPT | Mod: 26,RT,, | Performed by: RADIOLOGY

## 2022-01-10 PROCEDURE — 1159F PR MEDICATION LIST DOCUMENTED IN MEDICAL RECORD: ICD-10-PCS | Mod: CPTII,S$GLB,, | Performed by: ORTHOPAEDIC SURGERY

## 2022-01-10 PROCEDURE — 73560 X-RAY EXAM OF KNEE 1 OR 2: CPT | Mod: 59,TC,PO,RT

## 2022-01-10 PROCEDURE — 99999 PR PBB SHADOW E&M-EST. PATIENT-LVL III: ICD-10-PCS | Mod: PBBFAC,,, | Performed by: ORTHOPAEDIC SURGERY

## 2022-01-10 PROCEDURE — 73562 X-RAY EXAM OF KNEE 3: CPT | Mod: 26,LT,, | Performed by: RADIOLOGY

## 2022-01-10 PROCEDURE — 97112 NEUROMUSCULAR REEDUCATION: CPT | Mod: PN | Performed by: PHYSICAL THERAPIST

## 2022-01-10 PROCEDURE — 99024 PR POST-OP FOLLOW-UP VISIT: ICD-10-PCS | Mod: S$GLB,,, | Performed by: ORTHOPAEDIC SURGERY

## 2022-01-10 PROCEDURE — 73560 XR KNEE ORTHO LEFT: ICD-10-PCS | Mod: 26,RT,, | Performed by: RADIOLOGY

## 2022-01-10 PROCEDURE — 73562 X-RAY EXAM OF KNEE 3: CPT | Mod: TC,PO,LT

## 2022-01-10 NOTE — PROGRESS NOTES
[facility]   Physical Therapy Treatment Note     Name: Magda Farris  Clinic Number: [id number]    Therapy Diagnosis:   Encounter Diagnoses   Name Primary?    Chronic pain of left knee Yes    Decreased range of motion     Muscle weakness     Difficulty walking      Physician: Akshat John MD    Visit Date: 1/10/2022    Physician Orders: PT Eval and Treat  Progressive weight-bearing to tolerance, will get her set up with physical therapy to assist with range of motion strengthening gait training, follow-up in 6 weeks time is a postoperative visit with x-rays of her left knee  Medical Diagnosis from Referral:   S82.142A (ICD-10-CM) - Closed fracture of left tibial plateau, initial encounter   M79.662,M79.89 (ICD-10-CM) - Pain and swelling of lower leg, left         Evaluation Date: 11/29/2021  Authorization Period Expiration: 1-31-22  Plan of Care Expiration: 2-18-22  Visit # / Visits authorized: 8/ 24 (+1 from eval)  MD Follow up appointment: none scheduled     PTA Visit #: 3/5     Time In: 3:03  Time Out: 3:12  Total Billable Time: 57 minutes     Precautions: Standard, Weightbearing and pt is post tibial plateau fracture    SUBJECTIVE.      Pt reports: went to MD and he stated everything looked good.  Pt states she gets some soreness at times.  Pt states he instructed pt to progress as tolerated and if pain stop.  Pt states no restrictions and MD note reported to progress as tolerated    She was compliant with home exercise program.  Response to previous treatment: felt a lot better  Functional change: walking without restrictions.      Pain: 0/10 pain,  Initial eval Current 0/10, worst 3/10, best 0/10   Location: left knee      OBJECTIVE       Knee  ROM: (measured in degrees) 1-10-22 prior to Rx  Knee (L)   Flexion 120   Extension 5      Knee  ROM: (measured in degrees) 1-10-22 after Rx   Knee (L)   Flexion 125   Extension 5     Knee  ROM: (measured in degrees) initial  "eval  Knee (R) (L)   Flexion 140 80   Extension 5 -20            Treatment     Evelyn received the treatments listed below:      therapeutic exercises to develop strength, endurance, ROM, flexibility and core stabilization for 32 minutes including:     SLR x 20, no extensor lag 2# at ankle  Hip abd sidelying x 20   2#   Hip add sidelying x 20 -  2#   Hip extension prone x 20  2#   Hip ext prone with bent knee x 20  2#    Knee flexion prone with strap x 20 10 count hold cueing to get pt to pull to point of tightness not pain.  Pt reported feeling pain along patella tendon region. Instructed pt to back off pull Attempted towel under thigh with stretch and no change.    Heel slides x 20 cueing for proper stretching    HS stretch sitting x 10    Heel raises x 2/10   Minisquat x 20   GSS standing x 10    Neuromuscular re-education was performed to improve proprioception, strength and balance to restore functional capabilities in weight bearing for 25 minutes.     Lateral step down x 4" x 2/10   Wobble board x 10 Level 1 1 min balance B directions      Wall sits x 3 x 10 sec each     Shuttle 2 leg 2.5 bands x 2/10   Shuttle LLE 1 band 2/10, increase to 1.5 next visit   Abel's leg press 2 springs level 3 and level 4       Possible for next time: retro walk on TM for decreased hamstring activation.       Deferred to home: cold pack for 00 minutes to L knee        Patient Education and Home Exercises     Home Exercises Provided and Patient Education Provided     Education provided:   - Educated patient on expectation for fluctuations in soreness, swelling, and aching due to progression for strengthening as well as the healing process.  - pacing herself  - HEP    Written Home Exercises Provided: yes.   Exercises were reviewed and Evelyn was able to demonstrate them prior to the end of the session.  Evelyn demonstrated good  understanding of the education provided. See EMR under Patient Instructions in Notes section for " exercises provided during therapy sessions    ASSESSMENT   Pt mainor treatment well with progression with ROM ex, strengthening and stability.  Pt saw MD and able to progress with activities as tolerated with no restrictions other than personal pain level.  Pt with improved flexion after treatment and progression with balance and strengthening led to some quad fatigue, but no increased pain at end of session      Evelyn is progressing well towards her goals.   Pt prognosis is Good.     Pt will continue to benefit from skilled outpatient physical therapy to address the deficits listed in the problem list box on initial evaluation, provide pt/family education and to maximize pt's level of independence in the home and community environment.     Pt's spiritual, cultural and educational needs considered and pt agreeable to plan of care and goals.     Anticipated barriers to physical therapy: patient was out of town 12-13-21 until 1-3-22    Goals:   Short Term Goals:  3 weeks   Increase range of motion 25% MET  Increase strength 1/2 muscle grade (Progressing, not met)  Be able to perform HEP with minimal cueing required  MET     Long Term Goals: 6 weeks  Increase range of motion to 75% to 100% full  MET  Improve muscle strength 1 muscle grade(Progressing, not met)  Improve muscle strength with MMT to 4+/5 to 5/5(Progressing, not met)  Restore ability to ambulate with normal pain free gait  MET  Walking for ADL and exercise will be restored without increased pain(Progressing, not met)  Restore ability to stand for ADL without increased pain(Progressing, not met)  Restore ability to climb stairs in a reciprocal manner with min to 0 increased pain and/or difficulty(Progressing, not met)  Restore ability to perform ADL's and household activities independently and without increased pain(Progressing, not met)      PLAN   Continue with established plan of care towards PT goals.         Rosaline Alaniz, MS, PT

## 2022-01-10 NOTE — PROGRESS NOTES
Three months out from tibial plateau fracture doing well reports minimal to no pain walks in the assistive devices    Exam shows good motion and strength no instability    X-rays show healed tibial plateau fracture    Assessment:  Healed tibial plateau fracture    Plan:  Activities tolerance, strengthening over time, follow-up as needed

## 2022-03-31 PROBLEM — Z00.00 ANNUAL PHYSICAL EXAM: Status: ACTIVE | Noted: 2022-03-31

## 2022-03-31 PROBLEM — Z78.0 POSTMENOPAUSAL: Status: ACTIVE | Noted: 2022-03-31

## 2022-06-01 NOTE — PROGRESS NOTES
[facility]   Physical Therapy Treatment Note     Name: Magda Farris  Clinic Number: [id number]    Therapy Diagnosis:   Encounter Diagnoses   Name Primary?    Chronic pain of left knee Yes    Decreased range of motion     Muscle weakness     Difficulty walking      Physician: Akshat John MD    Visit Date: 1/4/2022    Physician Orders: PT Eval and Treat  Progressive weight-bearing to tolerance, will get her set up with physical therapy to assist with range of motion strengthening gait training, follow-up in 6 weeks time is a postoperative visit with x-rays of her left knee  Medical Diagnosis from Referral:   S82.142A (ICD-10-CM) - Closed fracture of left tibial plateau, initial encounter   M79.662,M79.89 (ICD-10-CM) - Pain and swelling of lower leg, left         Evaluation Date: 11/29/2021  Authorization Period Expiration: 1-31-22  Plan of Care Expiration: 1-10-22  Visit # / Visits authorized: 6/ 24 (+1 from eval)  MD Follow up appointment: 1-10-22    PTA Visit #: 3/5     Time In: 3:03  Time Out: 4:00  Total Billable Time: 54 minutes     Precautions: Standard, Weightbearing and pt is post tibial plateau fracture    SUBJECTIVE.      Pt reports: no pain currently, but still gets stiffness to the knee after prolonged sitting.   She was compliant with home exercise program.  Response to previous treatment: felt a lot better  Functional change: able to walk some without crutches    Pain: 0/10 pain, but discomfort from the swelling  Location: left knee      OBJECTIVE     Ambulating without crutches independently, slight antalgic gait     Treatment     Evelyn received the treatments listed below:      therapeutic exercises to develop strength, endurance, ROM, flexibility and core stabilization for 54 minutes including:  Quad set x 20 pt with good quad contraction, no need for FES  SAQ x 20 over medium roll  no lag   Heel slides x 20   SLR x 20, no extensor lag  Hip abd  "sidelying x 20  Hip add sidelying x 20 - some hamstring compensation noted  Hip extension prone x 20  HS stretch sitting x 10  GSS with strap x 10  Knee flexion sitting x 20  Knee extension stretch supine x 3 min     Heel raises x 2/10   Minisquat x 10 -cue for weight in midfoot  TKE at wall with ball behind knee 2 x 10 with 5 count     Shuttle 2 leg back, L leg lower, 2.5 bands, x 10   Upright bike (seat 4) L1 x 4 min for knee flexion    Possible for next time: retro walk on TM for decreased hamstring activation.     manual therapy techniques: Joint mobilizations were applied to the: patella for 00 minutes, including:  Patella mobs all planes gentle glides   STM to distal hamstring/proximal gastroc in prone  Declined - 2 10" lanterns with kinesiotape was provided to the knee to decrease swelling and pain.  Instructed pt in use, care and precautions with tape.    (NP) Gentle effleurage around the knee for decreased swelling      gait training to improve functional mobility and safety for 5 minutes, including:  heel to toe gait and knee ext until push off. Instructed patient to continue with 2 crutches as needed, possible use of house shoes for greater plantar foot support. Instructed pt to continue with crutches if she is unable to ambulate without normal gait with no device.      Deferred to home: cold pack for 00 minutes to L knee        Patient Education and Home Exercises     Home Exercises Provided and Patient Education Provided     Education provided:   - Educated patient on expectation for fluctuations in soreness, swelling, and aching due to progression for strengthening as well as the healing process.  - use of crutches as needed for comfort with walking and weight bearing activities    Written Home Exercises Provided: continue with prior HEP.   Exercises were reviewed and Evelyn was able to demonstrate them prior to the end of the session.  Evelyn demonstrated good  understanding of the education provided. " See EMR under Patient Instructions in Notes section for exercises provided during therapy sessions    ASSESSMENT   Patient tolerated exercises well, with improving gait mechanics and tolerance to strengthening activities. Mild stiffness noted to the anterior knee at end range flexion, which improved with reciprocal ROM on the upright bike. Greater quad activation with 2:1 shuttle press. Fatigue at end of session and pt will benefit from continued progression for quad strength, knee ROM, and gait as able.      Evelyn is progressing well towards her goals.   Pt prognosis is Good.     Pt will continue to benefit from skilled outpatient physical therapy to address the deficits listed in the problem list box on initial evaluation, provide pt/family education and to maximize pt's level of independence in the home and community environment.     Pt's spiritual, cultural and educational needs considered and pt agreeable to plan of care and goals.     Anticipated barriers to physical therapy: patient will be out of town 12-13-21 until 1-3-22    Goals:   Short Term Goals:  3 weeks (progressing)  Increase range of motion 25%  Increase strength 1/2 muscle grade  Be able to perform HEP with minimal cueing required     Long Term Goals: 6 weeks (Progressing, not met)  Increase range of motion to 75% to 100% full   Improve muscle strength 1 muscle grade  Improve muscle strength with MMT to 4+/5 to 5/5  Restore ability to ambulate with normal pain free gait  Walking for ADL and exercise will be restored without increased pain  Restore ability to stand for ADL without increased pain  Restore ability to climb stairs in a reciprocal manner with min to 0 increased pain and/or difficulty  Restore ability to perform ADL's and household activities independently and without increased pain      PLAN     Continue with current POC toward established therapy goals. Progress ROM, strength, and tolerance to WB as able.    Kathy Rodriguez, PTA      Yes

## 2022-07-04 PROBLEM — Z00.00 ANNUAL PHYSICAL EXAM: Status: RESOLVED | Noted: 2022-03-31 | Resolved: 2022-07-04

## 2023-09-25 ENCOUNTER — OFFICE VISIT (OUTPATIENT)
Dept: ORTHOPEDICS | Facility: CLINIC | Age: 58
End: 2023-09-25
Payer: COMMERCIAL

## 2023-09-25 VITALS — BODY MASS INDEX: 24.99 KG/M2 | HEIGHT: 65 IN | WEIGHT: 150 LBS

## 2023-09-25 DIAGNOSIS — S43.005A SHOULDER DISLOCATION, LEFT, INITIAL ENCOUNTER: ICD-10-CM

## 2023-09-25 PROCEDURE — 99999 PR PBB SHADOW E&M-EST. PATIENT-LVL III: ICD-10-PCS | Mod: PBBFAC,,, | Performed by: ORTHOPAEDIC SURGERY

## 2023-09-25 PROCEDURE — 99214 PR OFFICE/OUTPT VISIT, EST, LEVL IV, 30-39 MIN: ICD-10-PCS | Mod: S$GLB,,, | Performed by: ORTHOPAEDIC SURGERY

## 2023-09-25 PROCEDURE — 4010F ACE/ARB THERAPY RXD/TAKEN: CPT | Mod: CPTII,S$GLB,, | Performed by: ORTHOPAEDIC SURGERY

## 2023-09-25 PROCEDURE — 3008F BODY MASS INDEX DOCD: CPT | Mod: CPTII,S$GLB,, | Performed by: ORTHOPAEDIC SURGERY

## 2023-09-25 PROCEDURE — 4010F PR ACE/ARB THEARPY RXD/TAKEN: ICD-10-PCS | Mod: CPTII,S$GLB,, | Performed by: ORTHOPAEDIC SURGERY

## 2023-09-25 PROCEDURE — 1159F PR MEDICATION LIST DOCUMENTED IN MEDICAL RECORD: ICD-10-PCS | Mod: CPTII,S$GLB,, | Performed by: ORTHOPAEDIC SURGERY

## 2023-09-25 PROCEDURE — 3008F PR BODY MASS INDEX (BMI) DOCUMENTED: ICD-10-PCS | Mod: CPTII,S$GLB,, | Performed by: ORTHOPAEDIC SURGERY

## 2023-09-25 PROCEDURE — 1159F MED LIST DOCD IN RCRD: CPT | Mod: CPTII,S$GLB,, | Performed by: ORTHOPAEDIC SURGERY

## 2023-09-25 PROCEDURE — 99214 OFFICE O/P EST MOD 30 MIN: CPT | Mod: S$GLB,,, | Performed by: ORTHOPAEDIC SURGERY

## 2023-09-25 PROCEDURE — 99999 PR PBB SHADOW E&M-EST. PATIENT-LVL III: CPT | Mod: PBBFAC,,, | Performed by: ORTHOPAEDIC SURGERY

## 2023-10-30 ENCOUNTER — OFFICE VISIT (OUTPATIENT)
Dept: ORTHOPEDICS | Facility: CLINIC | Age: 58
End: 2023-10-30
Payer: COMMERCIAL

## 2023-10-30 VITALS — WEIGHT: 150 LBS | HEIGHT: 65 IN | BODY MASS INDEX: 24.99 KG/M2

## 2023-10-30 DIAGNOSIS — S43.005A SHOULDER DISLOCATION, LEFT, INITIAL ENCOUNTER: Primary | ICD-10-CM

## 2023-10-30 DIAGNOSIS — M25.512 LEFT SHOULDER PAIN, UNSPECIFIED CHRONICITY: ICD-10-CM

## 2023-10-30 PROCEDURE — 99213 OFFICE O/P EST LOW 20 MIN: CPT | Mod: S$GLB,,, | Performed by: ORTHOPAEDIC SURGERY

## 2023-10-30 PROCEDURE — 99999 PR PBB SHADOW E&M-EST. PATIENT-LVL III: ICD-10-PCS | Mod: PBBFAC,,, | Performed by: ORTHOPAEDIC SURGERY

## 2023-10-30 PROCEDURE — 3008F BODY MASS INDEX DOCD: CPT | Mod: CPTII,S$GLB,, | Performed by: ORTHOPAEDIC SURGERY

## 2023-10-30 PROCEDURE — 4010F PR ACE/ARB THEARPY RXD/TAKEN: ICD-10-PCS | Mod: CPTII,S$GLB,, | Performed by: ORTHOPAEDIC SURGERY

## 2023-10-30 PROCEDURE — 99999 PR PBB SHADOW E&M-EST. PATIENT-LVL III: CPT | Mod: PBBFAC,,, | Performed by: ORTHOPAEDIC SURGERY

## 2023-10-30 PROCEDURE — 1159F PR MEDICATION LIST DOCUMENTED IN MEDICAL RECORD: ICD-10-PCS | Mod: CPTII,S$GLB,, | Performed by: ORTHOPAEDIC SURGERY

## 2023-10-30 PROCEDURE — 3008F PR BODY MASS INDEX (BMI) DOCUMENTED: ICD-10-PCS | Mod: CPTII,S$GLB,, | Performed by: ORTHOPAEDIC SURGERY

## 2023-10-30 PROCEDURE — 99213 PR OFFICE/OUTPT VISIT, EST, LEVL III, 20-29 MIN: ICD-10-PCS | Mod: S$GLB,,, | Performed by: ORTHOPAEDIC SURGERY

## 2023-10-30 PROCEDURE — 1159F MED LIST DOCD IN RCRD: CPT | Mod: CPTII,S$GLB,, | Performed by: ORTHOPAEDIC SURGERY

## 2023-10-30 PROCEDURE — 4010F ACE/ARB THERAPY RXD/TAKEN: CPT | Mod: CPTII,S$GLB,, | Performed by: ORTHOPAEDIC SURGERY

## 2023-10-30 NOTE — PROGRESS NOTES
58 years old 6 weeks out from shoulder dislocation 1st time dislocation that required reduction in the emergency room.  States she is feeling better    Exam shows rotator cuff is intact, positive apprehension testing     Assessment:  First-time dislocation left shoulder 6 weeks out     Plan:  Physical therapy for Gentle range of motion exercises, long-term rotator cuff strengthening and balancing, follow-up in about 6 weeks

## 2023-11-02 ENCOUNTER — CLINICAL SUPPORT (OUTPATIENT)
Dept: REHABILITATION | Facility: HOSPITAL | Age: 58
End: 2023-11-02
Attending: ORTHOPAEDIC SURGERY
Payer: COMMERCIAL

## 2023-11-02 DIAGNOSIS — M25.60 DECREASED RANGE OF MOTION: ICD-10-CM

## 2023-11-02 DIAGNOSIS — S43.005A SHOULDER DISLOCATION, LEFT, INITIAL ENCOUNTER: Primary | ICD-10-CM

## 2023-11-02 DIAGNOSIS — M62.81 MUSCLE WEAKNESS: ICD-10-CM

## 2023-11-02 PROCEDURE — 97110 THERAPEUTIC EXERCISES: CPT | Mod: PO

## 2023-11-02 PROCEDURE — 97161 PT EVAL LOW COMPLEX 20 MIN: CPT | Mod: PO
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
## 2023-11-08 ENCOUNTER — CLINICAL SUPPORT (OUTPATIENT)
Dept: REHABILITATION | Facility: HOSPITAL | Age: 58
End: 2023-11-08
Payer: COMMERCIAL

## 2023-11-08 DIAGNOSIS — M25.60 DECREASED RANGE OF MOTION: ICD-10-CM

## 2023-11-08 DIAGNOSIS — S43.005A SHOULDER DISLOCATION, LEFT, INITIAL ENCOUNTER: Primary | ICD-10-CM

## 2023-11-08 DIAGNOSIS — M62.81 MUSCLE WEAKNESS: ICD-10-CM

## 2023-11-08 PROCEDURE — 97110 THERAPEUTIC EXERCISES: CPT | Mod: PO,CQ

## 2023-11-08 PROCEDURE — 97112 NEUROMUSCULAR REEDUCATION: CPT | Mod: PO,CQ

## 2023-11-08 NOTE — PROGRESS NOTES
[facility]   Physical Therapy Treatment Note     Name: Magda Farris  Clinic Number: [id number]    Therapy Diagnosis:   Encounter Diagnoses   Name Primary?    Shoulder dislocation, left, initial encounter Yes    Decreased range of motion     Muscle weakness      Physician: Akshat John MD    Visit Date: 11/8/2023  Physician Orders: PT Eval and Treat (Physical therapy for Gentle range of motion exercises, long-term rotator cuff strengthening and balancing)  Medical Diagnosis from Referral: Shoulder dislocation, left, initial encounter [S43.005A]  Evaluation Date: 11/2/2023  Authorization Period Expiration: 12/31/23  Plan of Care Expiration: 2/2/24  Progress Note Due: 12/1/23  Date of Surgery: left shoulder relocation on 9/18/23  Visit # / Visits authorized: 1/20   FOTO: 1/3    PTA Visit #: 1/5     Precautions: Standard and Recent Shoulder dislocation      Time In: 1500  Time Out: 1553  Total Billable Time: 53 minutes    SUBJECTIVE     Pt reports: certain motions are painful but she doesn't have pain at rest. She was compliant with home exercise program.  Response to previous treatment: no adverse effects   Functional change: too soon to determine     Pain: 0/10  Location: left shoulder      OBJECTIVE     Objective Measures updated at progress report unless specified.     Treatment     Evelyn received the treatments listed below:      therapeutic exercises to develop strength, endurance, ROM, flexibility, posture, and core stabilization for 13 minutes including:  PROM (L) shoulder x 5 minutes  AA shoulder flexion with dowel 2x10  Standing AA shoulder abduction with dowel 2x10  Standing IR/ER with red TB 2x10    HEP:  Shoulder flexion isometrics 6 s holds x 3 L  Shoulder extension isometrics 6 s holds x 3 L  Shoulder internal rotation isometrics 6 s holds x 3 L  Shoulder external rotation isometrics 6 s holds x 3 L    neuromuscular re-education activities to improve: Balance,  Coordination, Proprioception, and Posture for 40 minutes. The following activities were included:  Supine serratus punch with dowel 2x10, 3 sec hold   S/L ER 3x10, 1# 3 sec hold   Prone scap retraction x 10, 3 sec hold   Prone retraction + extension 2x10, 2#   Prone retraction + horizontal abduction in ER x5  Seated scapular retraction 2x10  Seated bruegger (red TB) 2x10, 3 sec hold   Yellow body blade: flex/ext, abd/add, IR/ER x 20 sec  ea position   Standing rows (green TB) 2x10  Standing extensions (green TB) 2x10    Patient Education and Home Exercises     Home Exercises Provided and Patient Education Provided     Education provided:   - HEP compliance    Written Home Exercises Provided: Patient instructed to cont prior HEP. Exercises were reviewed and Evelyn was able to demonstrate them prior to the end of the session.  Evelyn demonstrated good  understanding of the education provided. See EMR under Patient Instructions for exercises provided during therapy sessions    ASSESSMENT     Heavy focus on scapular and cuff stabilization exercises today due to dislocation. Manual stabilization of scapula at times but self correction improved significantly with repetition.Training effect and muscle fatigue easily achieved. Patient able to achieve full AA shoulder flexion in supine without complaints of pain.     Evelyn Is progressing well towards her goals.   Pt prognosis is Good.     Pt will continue to benefit from skilled outpatient physical therapy to address the deficits listed in the problem list box on initial evaluation, provide pt/family education and to maximize pt's level of independence in the home and community environment.     Pt's spiritual, cultural and educational needs considered and pt agreeable to plan of care and goals.     Anticipated barriers to physical therapy: none    Goals:   Short Term Goals: 4 weeks   Pt will be independent with the initial phase of their HEP in order to continue to make  functional gains outside of physical therapy.  Pt's DASH score will improve by 10 points to demonstrate improved left shoulder function.     Pt's left shoulder external rotation AROM will improve to 56 degrees to demonstrate improved mobility.   Pt's left shoulder internal rotation AROM will improve to 97 degrees to demonstrate improved mobility.    Pt's left shoulder abduction AROM will improve to 164 degrees to demonstrate improved mobility.  Pt's left shoulder extension AROM will improve to 50 degrees to demonstrate improved mobility.   Pt's bilateral shoulder extension strength will improve to 4/5 MMT score.      Long Term Goals: 8 weeks   Pt will be independent with the final phase of their HEP in order to continue to make functional gains outside of physical therapy.  Pt's DASH score will improve by 20 points to demonstrate improved left shoulder function.     Pt's left shoulder external rotation AROM will improve to 66 degrees to demonstrate improved mobility.   Pt's left shoulder internal rotation AROM will improve to 100 degrees to demonstrate improved mobility.    Pt's left shoulder abduction AROM will improve to 170 degrees to demonstrate improved mobility.  Pt's left shoulder extension AROM will improve to 56 degrees to demonstrate improved mobility.   Pt's bilateral shoulder extension strength will improve to 4+/5 MMT score.     PLAN     Continue current POC with emphasis on restoring ROM and promoting stability.     Angi Genao, PTA

## 2023-11-10 ENCOUNTER — CLINICAL SUPPORT (OUTPATIENT)
Dept: REHABILITATION | Facility: HOSPITAL | Age: 58
End: 2023-11-10
Payer: COMMERCIAL

## 2023-11-10 DIAGNOSIS — S43.005A SHOULDER DISLOCATION, LEFT, INITIAL ENCOUNTER: Primary | ICD-10-CM

## 2023-11-10 DIAGNOSIS — M25.60 DECREASED RANGE OF MOTION: ICD-10-CM

## 2023-11-10 DIAGNOSIS — M62.81 MUSCLE WEAKNESS: ICD-10-CM

## 2023-11-10 PROCEDURE — 97112 NEUROMUSCULAR REEDUCATION: CPT | Mod: PO,CQ

## 2023-11-10 PROCEDURE — 97110 THERAPEUTIC EXERCISES: CPT | Mod: PO,CQ

## 2023-11-10 NOTE — PROGRESS NOTES
"[facility]   Physical Therapy Treatment Note     Name: Magda Farris  Clinic Number: [id number]    Therapy Diagnosis:   Encounter Diagnoses   Name Primary?    Shoulder dislocation, left, initial encounter Yes    Decreased range of motion      Muscle weakness        Physician: Akshat John MD    Visit Date: 11/10/2023  Physician Orders: PT Eval and Treat (Physical therapy for Gentle range of motion exercises, long-term rotator cuff strengthening and balancing)  Medical Diagnosis from Referral: Shoulder dislocation, left, initial encounter [S43.005A]  Evaluation Date: 11/2/2023  Authorization Period Expiration: 12/31/23  Plan of Care Expiration: 2/2/24  Progress Note Due: 12/1/23  Date of Surgery: left shoulder relocation on 9/18/23  Visit # / Visits authorized: 2/20 +Eval  FOTO: 1/3    PTA Visit #: 2/5     Precautions: Standard and Recent Shoulder dislocation      Time In: 2:42 pm (late arrival time)  Time Out: 3:30 pm  Total Billable Time: 48 minutes    SUBJECTIVE     Pt reports: no L shoulder pain at rest and states "I'm surprised I didn't hurt too bad after last time".     She was compliant with home exercise program.  Response to previous treatment: no adverse effects   Functional change: too soon to determine     Pain: 0/10  Location: left shoulder      OBJECTIVE     Objective Measures updated at progress report unless specified.     Treatment     Evelyn received the treatments listed below:      therapeutic exercises to develop strength, endurance, ROM, flexibility, posture, and core stabilization for 18 minutes including:  PROM (L) shoulder x 5 minutes  AA shoulder flexion with dowel 2x10  Standing AA shoulder abduction with dowel 2x10  Standing IR/ER with red TB 2x10    HEP:  Shoulder flexion isometrics 6 s holds x 3 L  Shoulder extension isometrics 6 s holds x 3 L  Shoulder internal rotation isometrics 6 s holds x 3 L  Shoulder external rotation isometrics 6 s holds x " 3 L    neuromuscular re-education activities to improve: Balance, Coordination, Proprioception, and Posture for 30 minutes. The following activities were included:  Supine serratus punch with 3# dowel 2x10, 3 sec hold   S/L ER 3x10, 1# 3 sec hold   Prone scap retraction x 10, 3 sec hold   Prone retraction + extension 2x10, 2#   Prone retraction + horizontal abduction in ER x15  Seated scapular retraction 2x10  Seated bruegger (red TB) 2x10, 3 sec hold   Yellow body blade: flex/ext, abd/add, IR/ER x 30 sec  ea position   Standing rows (green TB) 2x10  Standing extensions (blue TB) 2x10    Patient Education and Home Exercises     Home Exercises Provided and Patient Education Provided     Education provided:   - HEP compliance    Written Home Exercises Provided: Patient instructed to cont prior HEP. Exercises were reviewed and Evelyn was able to demonstrate them prior to the end of the session.  Evelyn demonstrated good  understanding of the education provided. See EMR under Patient Instructions for exercises provided during therapy sessions    ASSESSMENT   Evelyn returned without complaints of L shoulder pain at rest. Treatment continued to heavily emphasize scapular and posterior rotator cuff stabilization, progressing by increasing resistance and/or repetitions to a number of exercises to increase muscular challenge. She tolerated treatment well with no increase in pain but did require frequent VC to decrease muscle guarding during PROM. Will continue to progress per pt's tolerance.      Evelyn Is progressing well towards her goals.   Pt prognosis is Good.     Pt will continue to benefit from skilled outpatient physical therapy to address the deficits listed in the problem list box on initial evaluation, provide pt/family education and to maximize pt's level of independence in the home and community environment.     Pt's spiritual, cultural and educational needs considered and pt agreeable to plan of care and  goals.     Anticipated barriers to physical therapy: none    Goals:   Short Term Goals: 4 weeks   Pt will be independent with the initial phase of their HEP in order to continue to make functional gains outside of physical therapy.  Pt's DASH score will improve by 10 points to demonstrate improved left shoulder function.     Pt's left shoulder external rotation AROM will improve to 56 degrees to demonstrate improved mobility.   Pt's left shoulder internal rotation AROM will improve to 97 degrees to demonstrate improved mobility.    Pt's left shoulder abduction AROM will improve to 164 degrees to demonstrate improved mobility.  Pt's left shoulder extension AROM will improve to 50 degrees to demonstrate improved mobility.   Pt's bilateral shoulder extension strength will improve to 4/5 MMT score.      Long Term Goals: 8 weeks   Pt will be independent with the final phase of their HEP in order to continue to make functional gains outside of physical therapy.  Pt's DASH score will improve by 20 points to demonstrate improved left shoulder function.     Pt's left shoulder external rotation AROM will improve to 66 degrees to demonstrate improved mobility.   Pt's left shoulder internal rotation AROM will improve to 100 degrees to demonstrate improved mobility.    Pt's left shoulder abduction AROM will improve to 170 degrees to demonstrate improved mobility.  Pt's left shoulder extension AROM will improve to 56 degrees to demonstrate improved mobility.   Pt's bilateral shoulder extension strength will improve to 4+/5 MMT score.     PLAN     Continue current POC with emphasis on restoring ROM and promoting stability.     Patrick Banks, PTA

## 2023-11-16 ENCOUNTER — CLINICAL SUPPORT (OUTPATIENT)
Dept: REHABILITATION | Facility: HOSPITAL | Age: 58
End: 2023-11-16
Payer: COMMERCIAL

## 2023-11-16 DIAGNOSIS — M62.81 MUSCLE WEAKNESS: ICD-10-CM

## 2023-11-16 DIAGNOSIS — S43.005A SHOULDER DISLOCATION, LEFT, INITIAL ENCOUNTER: Primary | ICD-10-CM

## 2023-11-16 PROCEDURE — 97112 NEUROMUSCULAR REEDUCATION: CPT | Mod: PO

## 2023-11-16 PROCEDURE — 97110 THERAPEUTIC EXERCISES: CPT | Mod: PO,CQ

## 2023-11-16 NOTE — PROGRESS NOTES
[facility]   Physical Therapy Treatment Note     Name: Magda Farris  Clinic Number: [id number]    Therapy Diagnosis:   Encounter Diagnoses   Name Primary?    Shoulder dislocation, left, initial encounter Yes    Decreased range of motion      Muscle weakness        Physician: Akshat John MD    Visit Date: 11/16/2023  Physician Orders: PT Eval and Treat (Physical therapy for Gentle range of motion exercises, long-term rotator cuff strengthening and balancing)  Medical Diagnosis from Referral: Shoulder dislocation, left, initial encounter [S43.005A]  Evaluation Date: 11/2/2023  Authorization Period Expiration: 12/31/23  Plan of Care Expiration: 2/2/24  Progress Note Due: 12/1/23  Date of Surgery: left shoulder relocation on 9/18/23  Visit # / Visits authorized: 3/20 +Eval  FOTO: 1/3    PTA Visit #: 2/5     Precautions: Standard and Recent Shoulder dislocation      Time In: 2:05 pm   Time Out: 2:50 pm  Total Billable Time: 45 minutes    SUBJECTIVE     Pt reports: having no pain with left shoulder at rest or with movement lately.     She was compliant with home exercise program.  Response to previous treatment: no adverse effects   Functional change: too soon to determine     Pain: 0/10  Location: left shoulder      OBJECTIVE     Objective Measures updated at progress report unless specified.     Treatment     Evelyn received the treatments listed below:      therapeutic exercises to develop strength, endurance, ROM, flexibility, posture, and core stabilization for 15 minutes including:  PROM (L) shoulder x 5 minutes  AA shoulder flexion with dowel 2x10 3#  Standing AA shoulder abduction with dowel 2x10  Standing IR/ER with red TB 2x10    HEP:  Shoulder flexion isometrics 6 s holds x 3 L  Shoulder extension isometrics 6 s holds x 3 L  Shoulder internal rotation isometrics 6 s holds x 3 L  Shoulder external rotation isometrics 6 s holds x 3 L    neuromuscular re-education  activities to improve: Balance, Coordination, Proprioception, and Posture for 30 minutes. The following activities were included:  Supine serratus punch with 3# dowel 2x10, 3 sec hold   S/L ER 3x10, 2# 3 sec hold   Prone scap retraction x 10, 3 sec hold   Prone retraction + extension 2x10, 2#   Prone retraction + horizontal abduction in ER x15  Seated scapular retraction 2x10  Seated bruegger (red TB) 2x10, 3 sec hold   Yellow body blade: flex/ext, abd/add, IR/ER x 30 sec  ea position   Standing rows (green TB) 2x10  Standing extensions (blue TB) 2x10    Patient Education and Home Exercises     Home Exercises Provided and Patient Education Provided     Education provided:   - HEP compliance    Written Home Exercises Provided: Patient instructed to cont prior HEP. Exercises were reviewed and Evelyn was able to demonstrate them prior to the end of the session.  Evelyn demonstrated good  understanding of the education provided. See EMR under Patient Instructions for exercises provided during therapy sessions    ASSESSMENT   Evelyn returned without complaints of having L shoulder pain at rest or motion lately.  Treatment continued to  emphasize scapular and posterior rotator cuff strengthening, progressing by increasing resistance to increase muscular challenge. She tolerated treatment well with no increase in pain but did require frequent VC to decrease muscle guarding during PROM. Pt stated she was fearful of shoulder flexion due to she thinks her shoulder may dislocate again. Will continue to progress per pt's tolerance. Issued pt new HEP sheet.       Evelyn Is progressing well towards her goals.   Pt prognosis is Good.     Pt will continue to benefit from skilled outpatient physical therapy to address the deficits listed in the problem list box on initial evaluation, provide pt/family education and to maximize pt's level of independence in the home and community environment.     Pt's spiritual, cultural and  educational needs considered and pt agreeable to plan of care and goals.     Anticipated barriers to physical therapy: none    Goals:   Short Term Goals: 4 weeks   Pt will be independent with the initial phase of their HEP in order to continue to make functional gains outside of physical therapy.  Pt's DASH score will improve by 10 points to demonstrate improved left shoulder function.     Pt's left shoulder external rotation AROM will improve to 56 degrees to demonstrate improved mobility.   Pt's left shoulder internal rotation AROM will improve to 97 degrees to demonstrate improved mobility.    Pt's left shoulder abduction AROM will improve to 164 degrees to demonstrate improved mobility.  Pt's left shoulder extension AROM will improve to 50 degrees to demonstrate improved mobility.   Pt's bilateral shoulder extension strength will improve to 4/5 MMT score.      Long Term Goals: 8 weeks   Pt will be independent with the final phase of their HEP in order to continue to make functional gains outside of physical therapy.  Pt's DASH score will improve by 20 points to demonstrate improved left shoulder function.     Pt's left shoulder external rotation AROM will improve to 66 degrees to demonstrate improved mobility.   Pt's left shoulder internal rotation AROM will improve to 100 degrees to demonstrate improved mobility.    Pt's left shoulder abduction AROM will improve to 170 degrees to demonstrate improved mobility.  Pt's left shoulder extension AROM will improve to 56 degrees to demonstrate improved mobility.   Pt's bilateral shoulder extension strength will improve to 4+/5 MMT score.     PLAN     Continue current POC with emphasis on restoring ROM and promoting stability.     Susannah Levy, PTA

## 2023-11-28 ENCOUNTER — CLINICAL SUPPORT (OUTPATIENT)
Dept: REHABILITATION | Facility: HOSPITAL | Age: 58
End: 2023-11-28
Payer: COMMERCIAL

## 2023-11-28 DIAGNOSIS — M25.60 DECREASED RANGE OF MOTION: ICD-10-CM

## 2023-11-28 DIAGNOSIS — M62.81 MUSCLE WEAKNESS: ICD-10-CM

## 2023-11-28 DIAGNOSIS — S43.005A SHOULDER DISLOCATION, LEFT, INITIAL ENCOUNTER: Primary | ICD-10-CM

## 2023-11-28 PROCEDURE — 97110 THERAPEUTIC EXERCISES: CPT | Mod: PO
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
## 2023-11-30 ENCOUNTER — CLINICAL SUPPORT (OUTPATIENT)
Dept: REHABILITATION | Facility: HOSPITAL | Age: 58
End: 2023-11-30
Payer: COMMERCIAL

## 2023-11-30 DIAGNOSIS — M62.81 MUSCLE WEAKNESS: ICD-10-CM

## 2023-11-30 DIAGNOSIS — S43.005A SHOULDER DISLOCATION, LEFT, INITIAL ENCOUNTER: Primary | ICD-10-CM

## 2023-11-30 DIAGNOSIS — M25.60 DECREASED RANGE OF MOTION: ICD-10-CM

## 2023-11-30 PROCEDURE — 97110 THERAPEUTIC EXERCISES: CPT | Mod: PO,CQ

## 2023-11-30 PROCEDURE — 97112 NEUROMUSCULAR REEDUCATION: CPT | Mod: PO,CQ

## 2023-11-30 NOTE — PROGRESS NOTES
[facility]   Physical Therapy Note      Name: Magda Farris  Clinic Number: [id number]     Therapy Diagnosis:        Encounter Diagnoses   Name Primary?    Shoulder dislocation, left, initial encounter Yes    Decreased range of motion      Muscle weakness          Physician: Akshat Jhon MD     Physician Orders: PT Eval and Treat (Physical therapy for Gentle range of motion exercises, long-term rotator cuff strengthening and balancing)  Medical Diagnosis from Referral: Shoulder dislocation, left, initial encounter [S43.005A]  Evaluation Date: 11/2/2023  Authorization Period Expiration: 12/31/23  Plan of Care Expiration: 2/2/24  Progress Note Due: 12/27/23  Date of Surgery: left shoulder relocation on 9/18/23  Visit # / Visits authorized: 5 / 20 + Eval   FOTO: 2/3     Precautions:  Recent left shoulder dislocation, Standard      Time In: 1:00 pm  Time Out: 2:00 pm  Total Billable Time: 30 minutes 1:1     Subjective   Pt reports: no L shoulder pain currently and notes improved shoulder IR flexibility while putting jacket on.    Pain:  Current 0/10, worst 4-5/10, best 0/10   Location: Left shoulder       Objective    Sensation: intact in B UEs     Palpation: (previous)  Soreness around left bicep and supraspinatus     AROM:  UE  Shoulder flexion: R: 174 degrees (previous), L: 163 degrees  Shoulder abduction: R: 174 degrees (previous), L: 181 degrees  Shoulder extension: R: 56 degrees (previous), L: 53 degrees  Shoulder internal rotation: R: 105 degrees(previous) , L: 105 degrees  Shoulder external rotation: R: 84 degrees (previous), L: 79 degrees  Elbow flexion: WNL B  Elbow extension: WNL B     Functional: (previous)  Reaching behind back: R: To mid-thoracic spine, L: to mid-thoracic spine but hesitant      MMTs/Strength:  Elbow flexion: 5/5 B  Elbow extension: 5/5 B  Shoulder flexion: R: 3+/5, L: 5/5  Shoulder abduction: R: 3+/5, L: 5/5   Shoulder internal rotation: R: 4/5,  L: 4/5  Shoulder external rotation: R: 3+/5, L: 3+/5  Shoulder extension: R: 3+/5, L: 3+/5     Intake Outcome Measure for FOTO (DASH) Survey     Therapist reviewed FOTO scores for Magda Farris on 11/28/2023.   FOTO report - see Media section or FOTO account episode details.     Intake Score: DASH: 5.0, FOTO Primary: 84            Treatment   therapeutic exercises to develop strength, endurance, ROM, flexibility, posture, and core stabilization for 15 minutes including:    UBE level 3 x 10 minutes (forward/backward)  PROM (L) shoulder x 5 minutes     HEP:  Shoulder flexion isometrics 6 s holds x 3 L  Shoulder extension isometrics 6 s holds x 3 L  Shoulder internal rotation isometrics 6 s holds x 3 L  Shoulder external rotation isometrics 6 s holds x 3 L     neuromuscular re-education activities to improve: Balance, Coordination, Proprioception, and Posture for 40 minutes. The following activities were included:  Supine serratus punch with 3# dowel 2x10, 3 sec hold   S/L ER 3x10,3# 3 sec hold   Prone scap rows 6# 2x10  Prone retraction + extension 2x10, 2#   Prone retraction + horizontal abduction in ER x15  Yellow body blade: flex/ext, abd/add, IR/ER x 30 sec  ea position  Standing extensions (blue TB) 2x10UE bike level 2 x 10 minutes (forwards and backwards)  Serratus wall slides  Wall clocks RTB x10    Not performed 11/30/2023 :  Seated bruegger (red TB) 2x10, 3 sec hold   Standing rows (green TB) 2x10  Shoulder flexion 3# 3 x 10 B  Shoulder abduction 3# 3 x 10 B  Shoulder IR with red TB x 10 B  Shoulder ER with red TB x 10 B  Shoulder extension with green TB 3 x 10 B         Patient Education and Home Exercises      Education provided:   - Pt was given a green TB for her HEP.  Pt also previously received a HEP consisting of supine serratus punches, resisted bruggers, resisted standing rows, resisted shoulder extension, side lying external rotation, and scapular retractions. Pt previously received a HEP  consisting of shoulder flexion AAROM, shoulder ER AAROM, shoulder abduction AAROM, prone shoulder extension, shoulder flexion isometrics, shoulder extension isometrics, shoulder abduction isometrics, shoulder internal rotation isometrics, and shoulder external rotation isometrics.       Written Home Exercises Provided: yes. Exercises were reviewed and Evelyn was able to demonstrate them prior to the end of the session.  Evelyn demonstrated good understanding of the education provided. See EMR under Patient Instructions for exercises provided during therapy sessions.     Assessment   Evelyn returned without complaints of L shoulder pain upon arrival for treatment and notes improved shoulder IR ROM while putting her jacket on this morning.  Treatment continued with L shoulder/periscapular strengthening and mobility ex's progressing by increasing resistance on prone rows and introducing serratus wall slides and wall clocks for added posterior shoulder/scapular strengthening. She tolerated exercises progressions well with no increase in pain and appropriate increase in muscular fatigue. Will continue to progress per pt's tolerance.    Patient prognosis is Good.  Patient will benefit from skilled outpatient Physical Therapy to address the deficits stated above and in the chart below, provide patient /family education, and to maximize patient's level of independence.      Plan of care discussed with patient: Yes     Anticipated Barriers for therapy: None     Problem List:  Recent left shoulder dislocation with relocation  Decreased left shoulder mobility  UE weakness  Decreased DASH score     Goals:  Short Term Goals: 4 weeks   Pt will be independent with the initial phase of their HEP in order to continue to make functional gains outside of physical therapy.  Pt's DASH score will improve by 10 points to demonstrate improved left shoulder function.  (met on 11/28/23)  Pt's left shoulder external rotation AROM will improve  to 56 degrees to demonstrate improved mobility. (met on 11/28/23)  Pt's left shoulder internal rotation AROM will improve to 97 degrees to demonstrate improved mobility.  (met on 11/28/23)  Pt's left shoulder abduction AROM will improve to 164 degrees to demonstrate improved mobility. (met on 11/28/23)  Pt's left shoulder extension AROM will improve to 50 degrees to demonstrate improved mobility. (met on 11/28/23)  Pt's bilateral shoulder extension strength will improve to 4/5 MMT score.      Long Term Goals: 8 weeks   Pt will be independent with the final phase of their HEP in order to continue to make functional gains outside of physical therapy.  Pt's DASH score will improve by 20 points to demonstrate improved left shoulder function.     Pt's left shoulder external rotation AROM will improve to 66 degrees to demonstrate improved mobility. (met on 11/28/23)  Pt's left shoulder internal rotation AROM will improve to 100 degrees to demonstrate improved mobility.  (met on 11/28/23)  Pt's left shoulder abduction AROM will improve to 170 degrees to demonstrate improved mobility. (met on 11/28/23)  Pt's left shoulder extension AROM will improve to 56 degrees to demonstrate improved mobility.   Pt's bilateral shoulder extension strength will improve to 4+/5 MMT score.   Pt's bilateral shoulder external rotation strength will improve to 4+/5 MMT score.          Plan      Plan of care Certification: 11/2/2023 to 2/2/24.     Outpatient Physical Therapy 2 times weekly for 4-8 more weeks to include the following interventions: Electrical Stimulation  , Gait Training, Manual Therapy, Moist Heat/ Ice, Neuromuscular Re-ed, Patient Education, Therapeutic Activities, and Therapeutic Exercise.

## 2023-12-05 ENCOUNTER — CLINICAL SUPPORT (OUTPATIENT)
Dept: REHABILITATION | Facility: HOSPITAL | Age: 58
End: 2023-12-05
Payer: COMMERCIAL

## 2023-12-05 DIAGNOSIS — M62.81 MUSCLE WEAKNESS: ICD-10-CM

## 2023-12-05 DIAGNOSIS — S43.005A SHOULDER DISLOCATION, LEFT, INITIAL ENCOUNTER: Primary | ICD-10-CM

## 2023-12-05 PROCEDURE — 97112 NEUROMUSCULAR REEDUCATION: CPT | Mod: PO,CQ

## 2023-12-05 NOTE — PROGRESS NOTES
[facility]   Physical Therapy Note      Name: Magda Farris  Clinic Number: [id number]     Therapy Diagnosis:        Encounter Diagnoses   Name Primary?    Shoulder dislocation, left, initial encounter Yes    Decreased range of motion      Muscle weakness          Physician: Akshat John MD     Physician Orders: PT Eval and Treat (Physical therapy for Gentle range of motion exercises, long-term rotator cuff strengthening and balancing)  Medical Diagnosis from Referral: Shoulder dislocation, left, initial encounter [S43.005A]  Evaluation Date: 11/2/2023  Authorization Period Expiration: 12/31/23  Plan of Care Expiration: 2/2/24  Progress Note Due: 12/27/23  Date of Surgery: left shoulder relocation on 9/18/23  Visit # / Visits authorized: 5 / 20 + Eval   FOTO: 2/3     Precautions:  Recent left shoulder dislocation, Standard      Time In: 1:00 pm  Time Out: 2:00 pm  Total Billable Time: 30 minutes 1:1     Subjective   Pt reports: her R shoulder is starting to bother her also. No pain today, just feels a little tight with both shoulders.     Pain:  Current 0/10, worst 4-5/10, best 0/10   Location: Left shoulder       Objective    Sensation: intact in B UEs     Palpation: (previous)  Soreness around left bicep and supraspinatus     AROM:  UE  Shoulder flexion: R: 174 degrees (previous), L: 163 degrees  Shoulder abduction: R: 174 degrees (previous), L: 181 degrees  Shoulder extension: R: 56 degrees (previous), L: 53 degrees  Shoulder internal rotation: R: 105 degrees(previous) , L: 105 degrees  Shoulder external rotation: R: 84 degrees (previous), L: 79 degrees  Elbow flexion: WNL B  Elbow extension: WNL B     Functional: (previous)  Reaching behind back: R: To mid-thoracic spine, L: to mid-thoracic spine but hesitant      MMTs/Strength:  Elbow flexion: 5/5 B  Elbow extension: 5/5 B  Shoulder flexion: R: 3+/5, L: 5/5  Shoulder abduction: R: 3+/5, L: 5/5   Shoulder internal  rotation: R: 4/5, L: 4/5  Shoulder external rotation: R: 3+/5, L: 3+/5  Shoulder extension: R: 3+/5, L: 3+/5     Intake Outcome Measure for FOTO (DASH) Survey     Therapist reviewed FOTO scores for Magda Farris on 11/28/2023.   FOTO report - see Media section or FOTO account episode details.     Intake Score: DASH: 5.0, FOTO Primary: 84            Treatment   therapeutic exercises to develop strength, endurance, ROM, flexibility, posture, and core stabilization for 00 minutes including:    UBE level 3 x 10 minutes (forward/backward)-NP  PROM (L) shoulder x 5 minutes-NP     HEP:  Shoulder flexion isometrics 6 s holds x 3 L  Shoulder extension isometrics 6 s holds x 3 L  Shoulder internal rotation isometrics 6 s holds x 3 L  Shoulder external rotation isometrics 6 s holds x 3 L     neuromuscular re-education activities to improve: Balance, Coordination, Proprioception, and Posture for 30 minutes. The following activities were included:  Supine serratus punch with 3# dowel 2x10, 3 sec hold   S/L ER 3x10,3# 3 sec hold   Prone scap rows 6# 2x10  Prone retraction + extension 2x10, 2#   Prone retraction + horizontal abduction in ER 2 x 10  Yellow body blade: flex/ext, abd/add, IR/ER x 30 sec  ea position-NP  Standing extensions (blue TB) 2x10  Serratus wall slides w/ pillow  Wall clocks RTB x10-NP    Not performed 12/5/2023 :  Seated bruegger (red TB) 2x10, 3 sec hold   Standing rows (green TB) 2x10  Shoulder flexion 3# 3 x 10 B  Shoulder abduction 3# 3 x 10 B  Shoulder IR with red TB x 10 B  Shoulder ER with red TB x 10 B  Shoulder extension with green TB 3 x 10 B         Patient Education and Home Exercises      Education provided:   - Pt was given a green TB for her HEP.  Pt also previously received a HEP consisting of supine serratus punches, resisted bruggers, resisted standing rows, resisted shoulder extension, side lying external rotation, and scapular retractions. Pt previously received a HEP consisting of  shoulder flexion AAROM, shoulder ER AAROM, shoulder abduction AAROM, prone shoulder extension, shoulder flexion isometrics, shoulder extension isometrics, shoulder abduction isometrics, shoulder internal rotation isometrics, and shoulder external rotation isometrics.       Written Home Exercises Provided: yes. Exercises were reviewed and Evelyn was able to demonstrate them prior to the end of the session.  Evelyn demonstrated good understanding of the education provided. See EMR under Patient Instructions for exercises provided during therapy sessions.     Assessment   Evelyn tolerated all therex well with no adverse affect. He B shoulders felt looser post session. Pt's session limited due to pt late and pt needed to leave early for an appointment. Will continue to progress per pt's tolerance.    Patient prognosis is Good.  Patient will benefit from skilled outpatient Physical Therapy to address the deficits stated above and in the chart below, provide patient /family education, and to maximize patient's level of independence.      Plan of care discussed with patient: Yes     Anticipated Barriers for therapy: None     Problem List:  Recent left shoulder dislocation with relocation  Decreased left shoulder mobility  UE weakness  Decreased DASH score     Goals:  Short Term Goals: 4 weeks   Pt will be independent with the initial phase of their HEP in order to continue to make functional gains outside of physical therapy.  Pt's DASH score will improve by 10 points to demonstrate improved left shoulder function.  (met on 11/28/23)  Pt's left shoulder external rotation AROM will improve to 56 degrees to demonstrate improved mobility. (met on 11/28/23)  Pt's left shoulder internal rotation AROM will improve to 97 degrees to demonstrate improved mobility.  (met on 11/28/23)  Pt's left shoulder abduction AROM will improve to 164 degrees to demonstrate improved mobility. (met on 11/28/23)  Pt's left shoulder extension AROM  will improve to 50 degrees to demonstrate improved mobility. (met on 11/28/23)  Pt's bilateral shoulder extension strength will improve to 4/5 MMT score.      Long Term Goals: 8 weeks   Pt will be independent with the final phase of their HEP in order to continue to make functional gains outside of physical therapy.  Pt's DASH score will improve by 20 points to demonstrate improved left shoulder function.     Pt's left shoulder external rotation AROM will improve to 66 degrees to demonstrate improved mobility. (met on 11/28/23)  Pt's left shoulder internal rotation AROM will improve to 100 degrees to demonstrate improved mobility.  (met on 11/28/23)  Pt's left shoulder abduction AROM will improve to 170 degrees to demonstrate improved mobility. (met on 11/28/23)  Pt's left shoulder extension AROM will improve to 56 degrees to demonstrate improved mobility.   Pt's bilateral shoulder extension strength will improve to 4+/5 MMT score.   Pt's bilateral shoulder external rotation strength will improve to 4+/5 MMT score.          Plan      Plan of care Certification: 11/2/2023 to 2/2/24.     Outpatient Physical Therapy 2 times weekly for 4-8 more weeks to include the following interventions: Electrical Stimulation  , Gait Training, Manual Therapy, Moist Heat/ Ice, Neuromuscular Re-ed, Patient Education, Therapeutic Activities, and Therapeutic Exercise.

## 2023-12-07 ENCOUNTER — CLINICAL SUPPORT (OUTPATIENT)
Dept: REHABILITATION | Facility: HOSPITAL | Age: 58
End: 2023-12-07
Payer: COMMERCIAL

## 2023-12-07 DIAGNOSIS — M25.60 DECREASED RANGE OF MOTION: ICD-10-CM

## 2023-12-07 DIAGNOSIS — M62.81 MUSCLE WEAKNESS: ICD-10-CM

## 2023-12-07 DIAGNOSIS — S43.005A SHOULDER DISLOCATION, LEFT, INITIAL ENCOUNTER: Primary | ICD-10-CM

## 2023-12-07 PROCEDURE — 97110 THERAPEUTIC EXERCISES: CPT | Mod: PO,CQ

## 2023-12-07 PROCEDURE — 97112 NEUROMUSCULAR REEDUCATION: CPT | Mod: PO,CQ

## 2023-12-07 NOTE — PROGRESS NOTES
"  [facility]   Physical Therapy Note      Name: Magda Farris  Clinic Number: [id number]     Therapy Diagnosis:        Encounter Diagnoses   Name Primary?    Shoulder dislocation, left, initial encounter Yes    Decreased range of motion      Muscle weakness          Physician: Akshat John MD     Physician Orders: PT Eval and Treat (Physical therapy for Gentle range of motion exercises, long-term rotator cuff strengthening and balancing)  Medical Diagnosis from Referral: Shoulder dislocation, left, initial encounter [S43.005A]  Evaluation Date: 11/2/2023  Authorization Period Expiration: 12/31/23  Plan of Care Expiration: 2/2/24  Progress Note Due: 12/27/23  Date of Surgery: left shoulder relocation on 9/18/23  Visit # / Visits authorized: 7 / 20   FOTO: 2/3     Precautions:  Recent left shoulder dislocation, Standard      Time In: 2:00 pm  Time Out: 2:55 pm  Total Billable Time: 55 minutes     Subjective   Pt reports:  no L shoulder pain and states "I have gotten much better". She notes increased ability/comfort with overhead motion while putting on a sweater.    Pain:  Current 0/10, worst 4-5/10, best 0/10   Location: Left shoulder       Objective    Sensation: intact in B UEs     Palpation: (previous)  Soreness around left bicep and supraspinatus     AROM:  UE  Shoulder flexion: R: 174 degrees (previous), L: 163 degrees  Shoulder abduction: R: 174 degrees (previous), L: 181 degrees  Shoulder extension: R: 56 degrees (previous), L: 53 degrees  Shoulder internal rotation: R: 105 degrees(previous) , L: 105 degrees  Shoulder external rotation: R: 84 degrees (previous), L: 79 degrees  Elbow flexion: WNL B  Elbow extension: WNL B     Functional: (previous)  Reaching behind back: R: To mid-thoracic spine, L: to mid-thoracic spine but hesitant      MMTs/Strength:  Elbow flexion: 5/5 B  Elbow extension: 5/5 B  Shoulder flexion: R: 3+/5, L: 5/5  Shoulder abduction: R: 3+/5, L: 5/5 "   Shoulder internal rotation: R: 4/5, L: 4/5  Shoulder external rotation: R: 3+/5, L: 3+/5  Shoulder extension: R: 3+/5, L: 3+/5     Intake Outcome Measure for FOTO (DASH) Survey     Therapist reviewed FOTO scores for Magda Farris on 11/28/2023.   FOTO report - see Media section or FOTO account episode details.     Intake Score: DASH: 5.0, FOTO Primary: 84            Treatment   therapeutic exercises to develop strength, endurance, ROM, flexibility, posture, and core stabilization for 10 minutes including:    UBE level 3 x 10 minutes (forward/backward)  PROM (L) shoulder x 5 minutes-NP     HEP:  Shoulder flexion isometrics 6 s holds x 3 L  Shoulder extension isometrics 6 s holds x 3 L  Shoulder internal rotation isometrics 6 s holds x 3 L  Shoulder external rotation isometrics 6 s holds x 3 L     neuromuscular re-education activities to improve: Balance, Coordination, Proprioception, and Posture for 45 minutes. The following activities were included:  Supine serratus punch with 3# dowel 2x10, 3 sec hold   S/L ER 3x10,3# 3 sec hold   Prone scap rows 6# 2x10  Prone retraction + extension 2x10, 2#   Prone retraction + horizontal abduction in ER 2 x 10  Yellow body blade: flex/ext, abd/add, IR/ER x 30 sec  ea position  Standing extensions (blue TB) 2x10  Serratus wall slides w/ pillowcase x20  Wall clocks RTB x10    Not performed 12/7/2023 :  Seated bruegger (red TB) 2x10, 3 sec hold   Standing rows (green TB) 2x10  Shoulder flexion 3# 3 x 10 B  Shoulder abduction 3# 3 x 10 B  Shoulder IR with red TB x 10 B  Shoulder ER with red TB x 10 B  Shoulder extension with green TB 3 x 10 B         Patient Education and Home Exercises      Education provided:   - Pt was given a green TB for her HEP.  Pt also previously received a HEP consisting of supine serratus punches, resisted bruggers, resisted standing rows, resisted shoulder extension, side lying external rotation, and scapular retractions. Pt previously received  a HEP consisting of shoulder flexion AAROM, shoulder ER AAROM, shoulder abduction AAROM, prone shoulder extension, shoulder flexion isometrics, shoulder extension isometrics, shoulder abduction isometrics, shoulder internal rotation isometrics, and shoulder external rotation isometrics.       Written Home Exercises Provided: yes. Exercises were reviewed and Evelyn was able to demonstrate them prior to the end of the session.  Evelyn demonstrated good understanding of the education provided. See EMR under Patient Instructions for exercises provided during therapy sessions.     Assessment   Evelyn tolerated treatment fair without any increase in L shoulder pain but was quick to fatigue especially with body blade shoulder stabilization exercises.  Treatment progressed by reincorporating exercises that were not performed last visit due pt requesting to leave early. Will continue to progress per pt's tolerance to improve shoulder mobility and stability.    Patient prognosis is Good.  Patient will benefit from skilled outpatient Physical Therapy to address the deficits stated above and in the chart below, provide patient /family education, and to maximize patient's level of independence.      Plan of care discussed with patient: Yes     Anticipated Barriers for therapy: None     Problem List:  Recent left shoulder dislocation with relocation  Decreased left shoulder mobility  UE weakness  Decreased DASH score     Goals:  Short Term Goals: 4 weeks   Pt will be independent with the initial phase of their HEP in order to continue to make functional gains outside of physical therapy.  Pt's DASH score will improve by 10 points to demonstrate improved left shoulder function.  (met on 11/28/23)  Pt's left shoulder external rotation AROM will improve to 56 degrees to demonstrate improved mobility. (met on 11/28/23)  Pt's left shoulder internal rotation AROM will improve to 97 degrees to demonstrate improved mobility.  (met on  11/28/23)  Pt's left shoulder abduction AROM will improve to 164 degrees to demonstrate improved mobility. (met on 11/28/23)  Pt's left shoulder extension AROM will improve to 50 degrees to demonstrate improved mobility. (met on 11/28/23)  Pt's bilateral shoulder extension strength will improve to 4/5 MMT score.      Long Term Goals: 8 weeks   Pt will be independent with the final phase of their HEP in order to continue to make functional gains outside of physical therapy.  Pt's DASH score will improve by 20 points to demonstrate improved left shoulder function.     Pt's left shoulder external rotation AROM will improve to 66 degrees to demonstrate improved mobility. (met on 11/28/23)  Pt's left shoulder internal rotation AROM will improve to 100 degrees to demonstrate improved mobility.  (met on 11/28/23)  Pt's left shoulder abduction AROM will improve to 170 degrees to demonstrate improved mobility. (met on 11/28/23)  Pt's left shoulder extension AROM will improve to 56 degrees to demonstrate improved mobility.   Pt's bilateral shoulder extension strength will improve to 4+/5 MMT score.   Pt's bilateral shoulder external rotation strength will improve to 4+/5 MMT score.          Plan      Plan of care Certification: 11/2/2023 to 2/2/24.     Outpatient Physical Therapy 2 times weekly for 4-8 more weeks to include the following interventions: Electrical Stimulation  , Gait Training, Manual Therapy, Moist Heat/ Ice, Neuromuscular Re-ed, Patient Education, Therapeutic Activities, and Therapeutic Exercise.

## 2023-12-12 ENCOUNTER — CLINICAL SUPPORT (OUTPATIENT)
Dept: REHABILITATION | Facility: HOSPITAL | Age: 58
End: 2023-12-12
Payer: COMMERCIAL

## 2023-12-12 DIAGNOSIS — M25.60 DECREASED RANGE OF MOTION: ICD-10-CM

## 2023-12-12 DIAGNOSIS — S43.005A SHOULDER DISLOCATION, LEFT, INITIAL ENCOUNTER: Primary | ICD-10-CM

## 2023-12-12 PROCEDURE — 97112 NEUROMUSCULAR REEDUCATION: CPT | Mod: PO,CQ

## 2023-12-12 PROCEDURE — 97110 THERAPEUTIC EXERCISES: CPT | Mod: PO,CQ

## 2023-12-12 NOTE — PROGRESS NOTES
[facility]   Physical Therapy Note      Name: Magda Farris  Clinic Number: [id number]     Therapy Diagnosis:        Encounter Diagnoses   Name Primary?    Shoulder dislocation, left, initial encounter Yes    Decreased range of motion      Muscle weakness          Physician: Akshat John MD     Physician Orders: PT Eval and Treat (Physical therapy for Gentle range of motion exercises, long-term rotator cuff strengthening and balancing)  Medical Diagnosis from Referral: Shoulder dislocation, left, initial encounter [S43.005A]  Evaluation Date: 11/2/2023  Authorization Period Expiration: 12/31/23  Plan of Care Expiration: 2/2/24  Progress Note Due: 12/27/23  Date of Surgery: left shoulder relocation on 9/18/23  Visit # / Visits authorized: 7 / 20   FOTO: 2/3     Precautions:  Recent left shoulder dislocation, Standard      Time In: 2:00 pm  Time Out: 2:53 pm  Total Billable Time: 53 minutes     Subjective   Pt reports: not having any shoulder pain today. She feels her B shoulders are getting stronger.     Pain:  Current 0/10, worst 4-5/10, best 0/10   Location: Left shoulder       Objective    Sensation: intact in B UEs     Palpation: (previous)  Soreness around left bicep and supraspinatus     AROM:  UE  Shoulder flexion: R: 174 degrees (previous), L: 163 degrees  Shoulder abduction: R: 174 degrees (previous), L: 181 degrees  Shoulder extension: R: 56 degrees (previous), L: 53 degrees  Shoulder internal rotation: R: 105 degrees(previous) , L: 105 degrees  Shoulder external rotation: R: 84 degrees (previous), L: 79 degrees  Elbow flexion: WNL B  Elbow extension: WNL B     Functional: (previous)  Reaching behind back: R: To mid-thoracic spine, L: to mid-thoracic spine but hesitant      MMTs/Strength:  Elbow flexion: 5/5 B  Elbow extension: 5/5 B  Shoulder flexion: R: 3+/5, L: 5/5  Shoulder abduction: R: 3+/5, L: 5/5   Shoulder internal rotation: R: 4/5, L: 4/5  Shoulder  external rotation: R: 3+/5, L: 3+/5  Shoulder extension: R: 3+/5, L: 3+/5     Intake Outcome Measure for FOTO (DASH) Survey     Therapist reviewed FOTO scores for Magda Farris on 11/28/2023.   FOTO report - see Media section or FOTO account episode details.     Intake Score: DASH: 5.0, FOTO Primary: 84            Treatment   therapeutic exercises to develop strength, endurance, ROM, flexibility, posture, and core stabilization for 10 minutes including:    UBE level 3 x 10 minutes (forward/backward)  PROM (L) shoulder x 5 minutes-NP     HEP:  Shoulder flexion isometrics 6 s holds x 3 L  Shoulder extension isometrics 6 s holds x 3 L  Shoulder internal rotation isometrics 6 s holds x 3 L  Shoulder external rotation isometrics 6 s holds x 3 L     neuromuscular re-education activities to improve: Balance, Coordination, Proprioception, and Posture for 42 minutes. The following activities were included:  Supine serratus punch with 3# dowel 2x10, 3 sec hold   S/L ER 3x10,3# 3 sec hold B  Prone scap rows 6# 2x10  Prone retraction + extension 2x10, 2#   Prone retraction + horizontal abduction in ER 2 x 10  Yellow body blade: flex/ext, abd/add, IR/ER x 30 sec  ea position  Standing extensions (blue TB) 2x10  Serratus wall slides w/ pillowcase x20  Wall clocks RTB x10  Waiters carry 8lbs  15ft x 4 B UE    Not performed 12/12/2023 :  Seated bruegger (red TB) 2x10, 3 sec hold   Standing rows (green TB) 2x10  Shoulder flexion 3# 3 x 10 B  Shoulder abduction 3# 3 x 10 B  Shoulder IR with red TB x 10 B  Shoulder ER with red TB x 10 B  Shoulder extension with green TB 3 x 10 B         Patient Education and Home Exercises      Education provided:   - Pt was given a green TB for her HEP.  Pt also previously received a HEP consisting of supine serratus punches, resisted bruggers, resisted standing rows, resisted shoulder extension, side lying external rotation, and scapular retractions. Pt previously received a HEP consisting  of shoulder flexion AAROM, shoulder ER AAROM, shoulder abduction AAROM, prone shoulder extension, shoulder flexion isometrics, shoulder extension isometrics, shoulder abduction isometrics, shoulder internal rotation isometrics, and shoulder external rotation isometrics.      Written Home Exercises Provided: yes. Exercises were reviewed and Evelyn was able to demonstrate them prior to the end of the session.  Evelyn demonstrated good understanding of the education provided. See EMR under Patient Instructions for exercises provided during therapy sessions.     Assessment   Evelyn tolerated treatment well, but did have fatigue with waiters carry more with L UE. Overall, pt conts to feel her B shoulders are getting stronger.  Will continue to progress per pt's tolerance to improve shoulder mobility and stability.    Patient prognosis is Good.  Patient will benefit from skilled outpatient Physical Therapy to address the deficits stated above and in the chart below, provide patient /family education, and to maximize patient's level of independence.      Plan of care discussed with patient: Yes     Anticipated Barriers for therapy: None     Problem List:  Recent left shoulder dislocation with relocation  Decreased left shoulder mobility  UE weakness  Decreased DASH score     Goals:  Short Term Goals: 4 weeks   Pt will be independent with the initial phase of their HEP in order to continue to make functional gains outside of physical therapy.  Pt's DASH score will improve by 10 points to demonstrate improved left shoulder function.  (met on 11/28/23)  Pt's left shoulder external rotation AROM will improve to 56 degrees to demonstrate improved mobility. (met on 11/28/23)  Pt's left shoulder internal rotation AROM will improve to 97 degrees to demonstrate improved mobility.  (met on 11/28/23)  Pt's left shoulder abduction AROM will improve to 164 degrees to demonstrate improved mobility. (met on 11/28/23)  Pt's left shoulder  extension AROM will improve to 50 degrees to demonstrate improved mobility. (met on 11/28/23)  Pt's bilateral shoulder extension strength will improve to 4/5 MMT score.      Long Term Goals: 8 weeks   Pt will be independent with the final phase of their HEP in order to continue to make functional gains outside of physical therapy.  Pt's DASH score will improve by 20 points to demonstrate improved left shoulder function.     Pt's left shoulder external rotation AROM will improve to 66 degrees to demonstrate improved mobility. (met on 11/28/23)  Pt's left shoulder internal rotation AROM will improve to 100 degrees to demonstrate improved mobility.  (met on 11/28/23)  Pt's left shoulder abduction AROM will improve to 170 degrees to demonstrate improved mobility. (met on 11/28/23)  Pt's left shoulder extension AROM will improve to 56 degrees to demonstrate improved mobility.   Pt's bilateral shoulder extension strength will improve to 4+/5 MMT score.   Pt's bilateral shoulder external rotation strength will improve to 4+/5 MMT score.          Plan      Plan of care Certification: 11/2/2023 to 2/2/24.     Outpatient Physical Therapy 2 times weekly for 4-8 more weeks to include the following interventions: Electrical Stimulation  , Gait Training, Manual Therapy, Moist Heat/ Ice, Neuromuscular Re-ed, Patient Education, Therapeutic Activities, and Therapeutic Exercise.

## 2023-12-14 ENCOUNTER — CLINICAL SUPPORT (OUTPATIENT)
Dept: REHABILITATION | Facility: HOSPITAL | Age: 58
End: 2023-12-14
Payer: COMMERCIAL

## 2023-12-14 DIAGNOSIS — S43.005A SHOULDER DISLOCATION, LEFT, INITIAL ENCOUNTER: Primary | ICD-10-CM

## 2023-12-14 DIAGNOSIS — M25.60 DECREASED RANGE OF MOTION: ICD-10-CM

## 2023-12-14 DIAGNOSIS — M62.81 MUSCLE WEAKNESS: ICD-10-CM

## 2023-12-14 PROCEDURE — 97110 THERAPEUTIC EXERCISES: CPT | Mod: PO,CQ

## 2023-12-14 PROCEDURE — 97112 NEUROMUSCULAR REEDUCATION: CPT | Mod: PO,CQ

## 2023-12-14 NOTE — PROGRESS NOTES
[facility]   Physical Therapy Note      Name: Magda Farris  Clinic Number: [id number]     Therapy Diagnosis:        Encounter Diagnoses   Name Primary?    Shoulder dislocation, left, initial encounter Yes    Decreased range of motion      Muscle weakness          Physician: Akshat John MD     Physician Orders: PT Eval and Treat (Physical therapy for Gentle range of motion exercises, long-term rotator cuff strengthening and balancing)  Medical Diagnosis from Referral: Shoulder dislocation, left, initial encounter [S43.005A]  Evaluation Date: 11/2/2023  Authorization Period Expiration: 12/31/23  Plan of Care Expiration: 2/2/24  Progress Note Due: 12/27/23  Date of Surgery: left shoulder relocation on 9/18/23  Visit # / Visits authorized: 7 / 20   FOTO: 2/3     Precautions:  Recent left shoulder dislocation, Standard      Time In: 2:00 pm  Time Out: 2:53 pm  Total Billable Time: 53 minutes     Subjective   Pt reports: not having any shoulder pain today. She is just tired today.     Pain:  Current 0/10, worst 4-5/10, best 0/10   Location: Left shoulder       Objective    Sensation: intact in B UEs     Palpation: (previous)  Soreness around left bicep and supraspinatus     AROM:  UE  Shoulder flexion: R: 174 degrees (previous), L: 163 degrees  Shoulder abduction: R: 174 degrees (previous), L: 181 degrees  Shoulder extension: R: 56 degrees (previous), L: 53 degrees  Shoulder internal rotation: R: 105 degrees(previous) , L: 105 degrees  Shoulder external rotation: R: 84 degrees (previous), L: 79 degrees  Elbow flexion: WNL B  Elbow extension: WNL B     Functional: (previous)  Reaching behind back: R: To mid-thoracic spine, L: to mid-thoracic spine but hesitant      MMTs/Strength:  Elbow flexion: 5/5 B  Elbow extension: 5/5 B  Shoulder flexion: R: 3+/5, L: 5/5  Shoulder abduction: R: 3+/5, L: 5/5   Shoulder internal rotation: R: 4/5, L: 4/5  Shoulder external rotation: R:  3+/5, L: 3+/5  Shoulder extension: R: 3+/5, L: 3+/5     Intake Outcome Measure for FOTO (DASH) Survey     Therapist reviewed FOTO scores for Magda Farris on 11/28/2023.   FOTO report - see Media section or FOTO account episode details.     Intake Score: DASH: 5.0, FOTO Primary: 84            Treatment   therapeutic exercises to develop strength, endurance, ROM, flexibility, posture, and core stabilization for 10 minutes including:    UBE level 3 x 10 minutes (forward/backward)  PROM (L) shoulder x 5 minutes-NP     HEP:  Shoulder flexion isometrics 6 s holds x 3 L  Shoulder extension isometrics 6 s holds x 3 L  Shoulder internal rotation isometrics 6 s holds x 3 L  Shoulder external rotation isometrics 6 s holds x 3 L     neuromuscular re-education activities to improve: Balance, Coordination, Proprioception, and Posture for 43 minutes. The following activities were included:  Supine serratus punch with 4# dowel 2x10, 3 sec hold   S/L ER 3x10,3# 3 sec hold B-NP  Prone scap rows 6# 2x10-NP  Prone retraction + extension 2x10, 2#-NP  Prone retraction + horizontal abduction in ER 2 x 10-NP  Yellow body blade: flex/ext, abd/add, IR/ER x 30 sec  ea position-NP  Standing extensions (blue TB) 3 x10  Serratus wall slides w/ pillowcase x20  Wall clocks RTB x10-NP  Waiters carry 8lbs 15ft x 4 B UE  Precor seated row #25 20x  Bird Dog w/ B Weight bearing    Not performed 12/14/2023 :  Seated bruegger (red TB) 2x10, 3 sec hold   Standing rows (green TB) 2x10  Shoulder flexion 3# 3 x 10 B  Shoulder abduction 3# 3 x 10 B  Shoulder IR with red TB x 10 B  Shoulder ER with red TB x 10 B  Shoulder extension with green TB 3 x 10 B         Patient Education and Home Exercises      Education provided:   - Pt was given a green TB for her HEP.  Pt also previously received a HEP consisting of supine serratus punches, resisted bruggers, resisted standing rows, resisted shoulder extension, side lying external rotation, and scapular  retractions. Pt previously received a HEP consisting of shoulder flexion AAROM, shoulder ER AAROM, shoulder abduction AAROM, prone shoulder extension, shoulder flexion isometrics, shoulder extension isometrics, shoulder abduction isometrics, shoulder internal rotation isometrics, and shoulder external rotation isometrics.      Written Home Exercises Provided: yes. Exercises were reviewed and Evelyn was able to demonstrate them prior to the end of the session.  Evelyn demonstrated good understanding of the education provided. See EMR under Patient Instructions for exercises provided during therapy sessions.     Assessment   Evelyn tolerated treatment well with a progressing of weight increases and more UE weight bearing to challenge shoulder stability. Overall, pt conts to feel her B shoulders are getting stronger.  Will continue to progress per pt's tolerance to improve shoulder mobility and stability.    Patient prognosis is Good.  Patient will benefit from skilled outpatient Physical Therapy to address the deficits stated above and in the chart below, provide patient /family education, and to maximize patient's level of independence.      Plan of care discussed with patient: Yes     Anticipated Barriers for therapy: None     Problem List:  Recent left shoulder dislocation with relocation  Decreased left shoulder mobility  UE weakness  Decreased DASH score     Goals:  Short Term Goals: 4 weeks   Pt will be independent with the initial phase of their HEP in order to continue to make functional gains outside of physical therapy.  Pt's DASH score will improve by 10 points to demonstrate improved left shoulder function.  (met on 11/28/23)  Pt's left shoulder external rotation AROM will improve to 56 degrees to demonstrate improved mobility. (met on 11/28/23)  Pt's left shoulder internal rotation AROM will improve to 97 degrees to demonstrate improved mobility.  (met on 11/28/23)  Pt's left shoulder abduction AROM will  improve to 164 degrees to demonstrate improved mobility. (met on 11/28/23)  Pt's left shoulder extension AROM will improve to 50 degrees to demonstrate improved mobility. (met on 11/28/23)  Pt's bilateral shoulder extension strength will improve to 4/5 MMT score.      Long Term Goals: 8 weeks   Pt will be independent with the final phase of their HEP in order to continue to make functional gains outside of physical therapy.  Pt's DASH score will improve by 20 points to demonstrate improved left shoulder function.     Pt's left shoulder external rotation AROM will improve to 66 degrees to demonstrate improved mobility. (met on 11/28/23)  Pt's left shoulder internal rotation AROM will improve to 100 degrees to demonstrate improved mobility.  (met on 11/28/23)  Pt's left shoulder abduction AROM will improve to 170 degrees to demonstrate improved mobility. (met on 11/28/23)  Pt's left shoulder extension AROM will improve to 56 degrees to demonstrate improved mobility.   Pt's bilateral shoulder extension strength will improve to 4+/5 MMT score.   Pt's bilateral shoulder external rotation strength will improve to 4+/5 MMT score.          Plan      Plan of care Certification: 11/2/2023 to 2/2/24.     Outpatient Physical Therapy 2 times weekly for 4-8 more weeks to include the following interventions: Electrical Stimulation  , Gait Training, Manual Therapy, Moist Heat/ Ice, Neuromuscular Re-ed, Patient Education, Therapeutic Activities, and Therapeutic Exercise.

## 2024-05-17 NOTE — PROGRESS NOTES
58 years old 1 week ago was dancing arm up overhead shoulder came out of socket is the 1st time this has happened went to emergency room had reduction    Exam shows positive apprehension testing, cuff strength weak and painful hand is functioning well    X-rays show concentric reduction after dislocation no associated fractures identified     Assessment:  1st time dislocation left shoulder    Plan:  Sling, okay for gentle daily range of motion, follow-up in a few weeks time for repeat exam particularly checking her rotator cuff  
4 = No assist / stand by assistance